# Patient Record
Sex: MALE | Race: WHITE | NOT HISPANIC OR LATINO | Employment: OTHER | URBAN - METROPOLITAN AREA
[De-identification: names, ages, dates, MRNs, and addresses within clinical notes are randomized per-mention and may not be internally consistent; named-entity substitution may affect disease eponyms.]

---

## 2017-04-19 ENCOUNTER — OFFICE VISIT (OUTPATIENT)
Dept: MEDICAL GROUP | Facility: MEDICAL CENTER | Age: 67
End: 2017-04-19
Attending: INTERNAL MEDICINE
Payer: MEDICAID

## 2017-04-19 ENCOUNTER — HOSPITAL ENCOUNTER (OUTPATIENT)
Facility: MEDICAL CENTER | Age: 67
End: 2017-04-19
Attending: INTERNAL MEDICINE
Payer: MEDICAID

## 2017-04-19 VITALS
WEIGHT: 175 LBS | TEMPERATURE: 97.6 F | SYSTOLIC BLOOD PRESSURE: 140 MMHG | HEART RATE: 114 BPM | HEIGHT: 69 IN | OXYGEN SATURATION: 97 % | RESPIRATION RATE: 16 BRPM | BODY MASS INDEX: 25.92 KG/M2 | DIASTOLIC BLOOD PRESSURE: 82 MMHG

## 2017-04-19 DIAGNOSIS — M54.50 CHRONIC BILATERAL LOW BACK PAIN WITHOUT SCIATICA: ICD-10-CM

## 2017-04-19 DIAGNOSIS — G89.29 CHRONIC BILATERAL LOW BACK PAIN WITHOUT SCIATICA: ICD-10-CM

## 2017-04-19 DIAGNOSIS — F41.9 ANXIETY: ICD-10-CM

## 2017-04-19 DIAGNOSIS — Z79.899 CHRONIC PRESCRIPTION BENZODIAZEPINE USE: ICD-10-CM

## 2017-04-19 DIAGNOSIS — F33.1 MODERATE EPISODE OF RECURRENT MAJOR DEPRESSIVE DISORDER (HCC): ICD-10-CM

## 2017-04-19 DIAGNOSIS — J30.2 SEASONAL ALLERGIC RHINITIS, UNSPECIFIED ALLERGIC RHINITIS TRIGGER: ICD-10-CM

## 2017-04-19 DIAGNOSIS — I10 ESSENTIAL HYPERTENSION: ICD-10-CM

## 2017-04-19 DIAGNOSIS — Z13.9 SCREENING: ICD-10-CM

## 2017-04-19 PROCEDURE — 99203 OFFICE O/P NEW LOW 30 MIN: CPT | Performed by: INTERNAL MEDICINE

## 2017-04-19 PROCEDURE — 99204 OFFICE O/P NEW MOD 45 MIN: CPT | Performed by: INTERNAL MEDICINE

## 2017-04-19 PROCEDURE — G0480 DRUG TEST DEF 1-7 CLASSES: HCPCS

## 2017-04-19 PROCEDURE — 80307 DRUG TEST PRSMV CHEM ANLYZR: CPT

## 2017-04-19 RX ORDER — ALPRAZOLAM 2 MG/1
2 TABLET ORAL 3 TIMES DAILY PRN
Qty: 100 TAB | Refills: 0 | Status: SHIPPED | OUTPATIENT
Start: 2017-04-19 | End: 2017-05-25 | Stop reason: SDUPTHER

## 2017-04-19 RX ORDER — TRAMADOL HYDROCHLORIDE 50 MG/1
50 TABLET ORAL EVERY 4 HOURS PRN
COMMUNITY
End: 2017-04-19

## 2017-04-19 RX ORDER — DIPHENHYDRAMINE HCL 25 MG
25 CAPSULE ORAL
COMMUNITY
Start: 2017-04-19

## 2017-04-19 RX ORDER — AMLODIPINE BESYLATE AND BENAZEPRIL HYDROCHLORIDE 5; 20 MG/1; MG/1
1 CAPSULE ORAL DAILY
Qty: 30 CAP | Refills: 6 | Status: SHIPPED | OUTPATIENT
Start: 2017-04-19

## 2017-04-19 RX ORDER — ESCITALOPRAM OXALATE 10 MG/1
10 TABLET ORAL DAILY
COMMUNITY
End: 2017-04-19

## 2017-04-19 RX ORDER — MELOXICAM 15 MG/1
15 TABLET ORAL DAILY
COMMUNITY
End: 2017-04-19 | Stop reason: SDUPTHER

## 2017-04-19 RX ORDER — AMLODIPINE BESYLATE AND BENAZEPRIL HYDROCHLORIDE 5; 20 MG/1; MG/1
1 CAPSULE ORAL DAILY
COMMUNITY
End: 2017-04-19 | Stop reason: SDUPTHER

## 2017-04-19 RX ORDER — ESCITALOPRAM OXALATE 20 MG/1
20 TABLET ORAL DAILY
Qty: 30 TAB | Refills: 6 | Status: SHIPPED | OUTPATIENT
Start: 2017-04-19

## 2017-04-19 RX ORDER — MELOXICAM 15 MG/1
15 TABLET ORAL DAILY
Qty: 30 TAB | Refills: 6 | Status: SHIPPED | OUTPATIENT
Start: 2017-04-19 | End: 2017-08-18

## 2017-04-19 ASSESSMENT — PATIENT HEALTH QUESTIONNAIRE - PHQ9: CLINICAL INTERPRETATION OF PHQ2 SCORE: 1

## 2017-04-19 NOTE — PROGRESS NOTES
Gunnar Mcneil is a 66 y.o. male here for anxiety, hypertension, medication refills, establish care    HPI: Patient was previously being seen by Dr. Tucker for primary care, however she recently changed clinics and now no longer accepts his Medicaid so he had to switch providers.    Chronic bilateral low back pain without sciatica  Patient reports a long history of chronic low back pain. In the past, he has been on Vicodin, but was able to wean himself off of it after he felt he was becoming addicted. He now currently takes meloxicam as needed 15 mg and occasionally will take an Aleve in addition to the meloxicam. His previous provider was also giving him tramadol but he did not feel that this was effective and would like to stop taking it. He uses the meloxicam and/or Aleve about 3 times a week. He stays active by walking daily.    Essential hypertension  Patient reports initially being diagnosed with hypertension at age 20. He currently takes combination amlodipine benazepril 5-20 milligrams daily, which he has been on for about 3 years.  He checks his blood pressure when he goes to the drug store and reports that his numbers are usually within normal range, although he cannot recall them specifically. His blood pressure in clinic today was 140/82.     Anxiety  Patient reports a long history of anxiety with panic attacks about once a month. States that these are usually triggered by changes in his life or bad events during the day. Patient has been on alprazolam for 30 years for the treatment of anxiety. He is also on Lexapro for depression, and reports that this has helped his anxiety somewhat. He was previously taking 8 mg of Xanax a day but has been able to reduce that down to 6 mg a day over the past year or so. He has tried to wean off of the medication and has had significant withdrawal symptoms including nausea, diarrhea, malaise, sweating. He is not interested in seeing a therapist for his anxiety. In  addition to medication, he uses painting and walking to help cope with his anxiety.    Moderate episode of recurrent major depressive disorder (CMS-HCC)  Patient reports a history of intermittent bouts of depression. He is currently taking Lexapro 10 mg daily which was started about 8 months ago by his previous primary care doctor. He did report an improvement in his depression after about 5 weeks of being on the medication. However, at this time he still is moderately symptomatic and would like to try an increased dose. He denies suicidal ideation. He is not interested in seeing a therapist at this time.    Seasonal allergies  Patient reports a history of seasonal allergies for which he takes as needed Benadryl. He reports trying other allergy specific medications in the past and nothing has been as effective as Benadryl.      Current medicines (including changes today)  Current Outpatient Prescriptions   Medication Sig Dispense Refill   • meloxicam (MOBIC) 15 MG tablet Take 1 Tab by mouth every day. 30 Tab 6   • amlodipine-benazepril (LOTREL) 5-20 MG per capsule Take 1 Cap by mouth every day. 30 Cap 6   • escitalopram (LEXAPRO) 20 MG tablet Take 1 Tab by mouth every day. 30 Tab 6   • alprazolam (XANAX) 2 MG tablet Take 1 Tab by mouth 3 times a day as needed for Sleep. 100 Tab 0   • diphenhydrAMINE (BENADRYL) 25 MG capsule Take 1 Cap by mouth 1 time daily as needed (allergy symptoms).       No current facility-administered medications for this visit.     He  has a past medical history of Hypertension; Depression; Anxiety; and Low back pain.  He  has no past surgical history.  Social History   Substance Use Topics   • Smoking status: Never Smoker    • Smokeless tobacco: Never Used   • Alcohol Use: No     Social History     Social History Narrative     Family History   Problem Relation Age of Onset   • Cancer Mother 65     breast   • Hypertension Mother    • Cancer Maternal Grandmother      lung     ROS  As above in  "history of present illness  All other systems reviewed and are negative     Objective:     Blood pressure 140/82, pulse 114, temperature 36.4 °C (97.6 °F), resp. rate 16, height 1.753 m (5' 9.02\"), weight 79.379 kg (175 lb), SpO2 97 %. Body mass index is 25.83 kg/(m^2).  Physical Exam:    Constitutional: Alert, no distress, anxious appearing.  Skin: Warm, dry, good turgor, no rashes in visible areas.  Eye: Equal, round and reactive, conjunctiva clear, lids normal.  ENMT: Lips without lesions, fair dentition, oropharynx clear.  Neck: Trachea midline, no masses, no thyromegaly. No cervical or supraclavicular lymphadenopathy.  Respiratory: Unlabored respiratory effort, lungs clear to auscultation, no wheezes, no ronchi.  Cardiovascular: Normal S1, S2, no murmur, no edema.  Abdomen: Soft, non-tender, no masses, no hepatosplenomegaly.  Psych: Alert and oriented x3, normal mood and affect.      Assessment and Plan:   The following treatment plan was discussed    1. Anxiety  Severe, controlled with high-dose daily benzodiazepines which the patient has been on for 30 years. Withdrawal symptoms when attempts to taper in the past. We discussed that by increasing the dose of his Lexapro for his depression, he may also have some benefit in his anxiety. We could consider in the future a slow taper down on the number of Xanax he receives per month. He reports trying to discontinue the medication entirely in the past and being unsuccessful. Controlled substance agreement and urine drug screen ordered today. Not interested in referral to therapy at this time  -Xanax 2 mg 3 times a day #100 to last one month  -Increase Lexapro to 20 mg daily  -Follow up in one month    2. Chronic bilateral low back pain without sciatica  Well controlled with NSAIDs as needed. Patient will continue meloxicam and Aleve as needed    3. Essential hypertension  Stable, well controlled with current medications  -Continue amlodipine-benazepril 5-20 " milligrams daily, refilled today    4. Moderate episode of recurrent major depressive disorder (CMS-HCC)  Stable although patient feels that his symptoms have been moderately controlled and he would like to increase his dose of Lexapro.  -Increase Lexapro to 20 mg daily  -Follow up in one month    5. Seasonal allergic rhinitis, unspecified allergic rhinitis trigger  Stable controlled with Benadryl as needed    6. Chronic prescription benzodiazepine use  See discussion above  - Controlled Substance Treatment Agreement  - PAIN MANAGEMENT PANEL, SCRN W/ RFLX TO QNT; Future    7. Screening  Following labs will be due in August, patient given lab orders today  - CBC WITH DIFFERENTIAL; Future  - COMP METABOLIC PANEL; Future  - HEMOGLOBIN A1C; Future  - LIPID PROFILE; Future  - PROSTATE SPECIFIC AG SCREENING; Future        Followup: Return in about 4 weeks (around 5/17/2017) for depression, anxiety.

## 2017-04-19 NOTE — ASSESSMENT & PLAN NOTE
Patient reports initially being diagnosed with hypertension at age 20. He currently takes combination amlodipine benazepril 5-20 milligrams daily, which he has been on for about 3 years.  He checks his blood pressure when he goes to the drug store and reports that his numbers are usually within normal range, although he cannot recall them specifically. His blood pressure in clinic today was 140/82.

## 2017-04-19 NOTE — ASSESSMENT & PLAN NOTE
Patient reports a long history of chronic low back pain. In the past, he has been on Vicodin, but was able to wean himself off of it after he felt he was becoming addicted. He now currently takes meloxicam as needed 15 mg and occasionally will take an Aleve in addition to the meloxicam. His previous provider was also giving him tramadol but he did not feel that this was effective and would like to stop taking it. He uses the meloxicam and/or Aleve about 3 times a week. He stays active by walking daily.

## 2017-04-19 NOTE — MR AVS SNAPSHOT
"        Gunnar Mcneil   2017 10:50 AM   Office Visit   MRN: [id number]    Department:  [facility]   Dept Phone:  [phone]    Description:  Male : 1950   Provider:  Denia Chatman M.D.           Reason for Visit     Establish Care medications, BP       Allergies as of 2017     No Known Allergies      You were diagnosed with     Anxiety   [272045]       Chronic bilateral low back pain without sciatica   [9957171]       Essential hypertension   [5169627]       Moderate episode of recurrent major depressive disorder (CMS-HCC)   [1176613]       Screening   [131550]       Chronic prescription benzodiazepine use   [7189078]         Vital Signs     Blood Pressure Pulse Temperature Respirations Height Weight    140/82 mmHg 114 36.4 °C (97.6 °F) 16 1.753 m (5' 9.02\") 79.379 kg (175 lb)    Body Mass Index Oxygen Saturation Smoking Status             25.83 kg/m2 97% Never Smoker          Basic Information     Date Of Birth Sex Race Ethnicity Preferred Language    1950 Male White Non- English      Your appointments     May 25, 2017  8:30 AM   Established Patient with Denia Chatman M.D.   [facility]    [address] [place] [id number]   [phone]           You will be receiving a confirmation call a few days before your appointment from our automated call confirmation system.              Problem List              ICD-10-CM Priority Class Noted - Resolved    Anxiety F41.9   2017 - Present    Chronic bilateral low back pain without sciatica M54.5, G89.29   2017 - Present    Essential hypertension I10   2017 - Present    Moderate episode of recurrent major depressive disorder (CMS-HCC) F33.1   2017 - Present    Chronic prescription benzodiazepine use Z79.899   2017 - Present      Health Maintenance     Patient has no pending health maintenance at this time      Current Immunizations     Tdap Vaccine 2014 11:18 PM      "   Below and/or attached are the medications your provider expects you to take. Review all of your home medications and newly ordered medications with your provider and/or pharmacist. Follow medication instructions as directed by your provider and/or pharmacist. Please keep your medication list with you and share with your provider. Update the information when medications are discontinued, doses are changed, or new medications (including over-the-counter products) are added; and carry medication information at all times in the event of emergency situations     Allergies:  No Known Allergies          Medications  Valid as of: April 19, 2017 - 11:20 AM    Generic Name Brand Name Tablet Size Instructions for use    ALPRAZolam (Tab) XANAX 2 MG Take 1 Tab by mouth 3 times a day as needed for Sleep.        Amlodipine Besy-Benazepril HCl (Cap) LOTREL 5-20 MG Take 1 Cap by mouth every day.        Escitalopram Oxalate (Tab) LEXAPRO 20 MG Take 1 Tab by mouth every day.        Meloxicam (Tab) MOBIC 15 MG Take 1 Tab by mouth every day.        .                 Medicines prescribed today were sent to:     [facility] [address], [place]  [facility] [address] [address]    Phone: [phone] Fax: [phone]    Open 24 Hours?: No      Medication refill instructions:       If your prescription bottle indicates you have medication refills left, it is not necessary to call your provider’s office. Please contact your pharmacy and they will refill your medication.    If your prescription bottle indicates you do not have any refills left, you may request refills at any time through one of the following ways: The online [employer] system (except Urgent Care), by calling your provider’s office, or by asking your pharmacy to contact your provider’s office with a refill request. Medication refills are processed only during regular business hours and may not be available until the next business day. Your  provider may request additional information or to have a follow-up visit with you prior to refilling your medication.   *Please Note: Medication refills are assigned a new Rx number when refilled electronically. Your pharmacy may indicate that no refills were authorized even though a new prescription for the same medication is available at the pharmacy. Please request the medicine by name with the pharmacy before contacting your provider for a refill.        Your To Do List     Future Labs/Procedures Complete By Expires    CBC WITH DIFFERENTIAL  As directed 4/20/2018    COMP METABOLIC PANEL  As directed 4/20/2018    HEMOGLOBIN A1C  As directed 4/20/2018    LIPID PROFILE  As directed 4/20/2018    PROSTATE SPECIFIC AG SCREENING  As directed 4/20/2018         MyChart Access Code: Activation code not generated  Current lark Status: Active

## 2017-04-19 NOTE — ASSESSMENT & PLAN NOTE
Patient reports a history of seasonal allergies for which he takes as needed Benadryl. He reports trying other allergy specific medications in the past and nothing has been as effective as Benadryl.

## 2017-04-19 NOTE — ASSESSMENT & PLAN NOTE
Patient reports a long history of anxiety with panic attacks about once a month. States that these are usually triggered by changes in his life or bad events during the day. Patient has been on alprazolam for 30 years for the treatment of anxiety. He is also on Lexapro for depression, and reports that this has helped his anxiety somewhat. He was previously taking 8 mg of Xanax a day but has been able to reduce that down to 6 mg a day over the past year or so. He has tried to wean off of the medication and has had significant withdrawal symptoms including nausea, diarrhea, malaise, sweating. He is not interested in seeing a therapist for his anxiety. In addition to medication, he uses painting and walking to help cope with his anxiety.

## 2017-04-21 LAB
AMPHET CTO UR CFM-MCNC: NEGATIVE NG/ML
BARBITURATES CTO UR CFM-MCNC: NEGATIVE NG/ML
BENZODIAZ CTO UR CFM-MCNC: POSITIVE NG/ML
BUPRENORPHINE UR-MCNC: NEGATIVE NG/ML
CANNABINOIDS CTO UR CFM-MCNC: NEGATIVE NG/ML
CARISOPRODOL UR-MCNC: NEGATIVE NG/ML
COCAINE CTO UR CFM-MCNC: NEGATIVE NG/ML
DRUG SCREEN COMMENT UR-IMP: NORMAL
ETHYL GLUCURONIDE UR QL SCN: NEGATIVE NG/ML
FENTANYL UR-MCNC: NEGATIVE NG/ML
MEPERIDINE CTO UR SCN-MCNC: NEGATIVE NG/ML
METHADONE CTO UR CFM-MCNC: NEGATIVE NG/ML
OPIATES UR QL SCN: NEGATIVE NG/ML
OXYCDOXYM URSCRN 2005102: NEGATIVE NG/ML
PCP CTO UR CFM-MCNC: NEGATIVE NG/ML
PROPOXYPH CTO UR CFM-MCNC: NEGATIVE NG/ML
TAPENTADOL UR-MCNC: NEGATIVE NG/ML
TRAMADOL CTO UR SCN-MCNC: NEGATIVE NG/ML
ZOLPIDEM UR-MCNC: NEGATIVE NG/ML

## 2017-04-23 LAB
1OH-MIDAZOLAM UR CFM-MCNC: <20 NG/ML
7AMINOCLONAZEPAM UR CFM-MCNC: <5 NG/ML
A-OH ALPRAZ UR CFM-MCNC: >1000 NG/ML
ALPRAZ UR CFM-MCNC: 299 NG/ML
CHLORDIAZEP UR CFM-MCNC: <20 NG/ML
CLONAZEPAM UR CFM-MCNC: <5 NG/ML
DIAZEPAM UR CFM-MCNC: <20 NG/ML
LORAZEPAM UR CFM-MCNC: <20 NG/ML
MIDAZOLAM UR CFM-MCNC: <20 NG/ML
NORDIAZEPAM UR CFM-MCNC: <20 NG/ML
OXAZEPAM UR CFM-MCNC: <20 NG/ML
TEMAZEPAM UR CFM-MCNC: <20 NG/ML

## 2017-05-25 ENCOUNTER — OFFICE VISIT (OUTPATIENT)
Dept: MEDICAL GROUP | Facility: MEDICAL CENTER | Age: 67
End: 2017-05-25
Attending: INTERNAL MEDICINE
Payer: MEDICAID

## 2017-05-25 VITALS
WEIGHT: 168 LBS | DIASTOLIC BLOOD PRESSURE: 72 MMHG | RESPIRATION RATE: 16 BRPM | OXYGEN SATURATION: 97 % | BODY MASS INDEX: 24.88 KG/M2 | SYSTOLIC BLOOD PRESSURE: 118 MMHG | HEART RATE: 80 BPM | HEIGHT: 69 IN | TEMPERATURE: 97.9 F

## 2017-05-25 DIAGNOSIS — F41.9 ANXIETY: ICD-10-CM

## 2017-05-25 DIAGNOSIS — I10 ESSENTIAL HYPERTENSION: ICD-10-CM

## 2017-05-25 DIAGNOSIS — G89.29 CHRONIC BILATERAL LOW BACK PAIN WITHOUT SCIATICA: ICD-10-CM

## 2017-05-25 DIAGNOSIS — L82.1 SEBORRHEIC KERATOSES: ICD-10-CM

## 2017-05-25 DIAGNOSIS — M54.50 CHRONIC BILATERAL LOW BACK PAIN WITHOUT SCIATICA: ICD-10-CM

## 2017-05-25 DIAGNOSIS — F33.42 RECURRENT MAJOR DEPRESSIVE DISORDER, IN FULL REMISSION (HCC): ICD-10-CM

## 2017-05-25 PROCEDURE — 99214 OFFICE O/P EST MOD 30 MIN: CPT | Performed by: INTERNAL MEDICINE

## 2017-05-25 PROCEDURE — 99212 OFFICE O/P EST SF 10 MIN: CPT | Performed by: INTERNAL MEDICINE

## 2017-05-25 RX ORDER — ALPRAZOLAM 2 MG/1
2 TABLET ORAL 4 TIMES DAILY PRN
Qty: 100 TAB | Refills: 0 | Status: SHIPPED | OUTPATIENT
Start: 2017-05-25 | End: 2017-05-25 | Stop reason: SDUPTHER

## 2017-05-25 RX ORDER — ALPRAZOLAM 2 MG/1
2 TABLET ORAL 4 TIMES DAILY PRN
Qty: 100 TAB | Refills: 0 | Status: SHIPPED | OUTPATIENT
Start: 2017-05-25 | End: 2017-08-18

## 2017-05-25 ASSESSMENT — PAIN SCALES - GENERAL: PAINLEVEL: NO PAIN

## 2017-05-25 NOTE — ASSESSMENT & PLAN NOTE
Patient reports a lot of sun exposure in his youth. He comes in today with a seborrheic keratosis on his right temple that he is noticed has grown a little bit in size and he was worried about this.

## 2017-05-25 NOTE — ASSESSMENT & PLAN NOTE
Patient reports dramatic improvement in his depression after we increased his dose of Lexapro from 10 mg daily to 20 mg daily. He has felt less anxious on this medication as well. He is not sure if he has had to rely less on his Xanax, although last month he got a prescription for 90 tablets when he was generally getting prescriptions for 100 tablets in the past, and he was able to make it up until this point.

## 2017-05-25 NOTE — MR AVS SNAPSHOT
"        Gunnar Mcneil   2017 8:30 AM   Office Visit   MRN: [id number]    [facility]   Dept Phone:  [phone]    Description:  Male : 1950   Provider:  Denia Chatman M.D.           Reason for Visit     Follow-Up med refill      Allergies as of 2017     No Known Allergies      Vital Signs     Blood Pressure Pulse Temperature Respirations Height Weight    118/72 mmHg 80 36.6 °C (97.9 °F) 16 1.753 m (5' 9.02\") 76.204 kg (168 lb)    Body Mass Index Oxygen Saturation Smoking Status             24.80 kg/m2 97% Never Smoker          Basic Information     Date Of Birth Sex Race Ethnicity Preferred Language    1950 Male White Non- English      Your appointments     Aug 01, 2017  7:15 AM   Adult Draw/Collection with [facility]   [facility] (--)    [address] [place] [address]   [phone]              Problem List              ICD-10-CM Priority Class Noted - Resolved    Anxiety F41.9   2017 - Present    Chronic bilateral low back pain without sciatica M54.5, G89.29   2017 - Present    Essential hypertension I10   2017 - Present    Moderate episode of recurrent major depressive disorder (CMS-HCC) F33.1   2017 - Present    Chronic prescription benzodiazepine use Z79.899   2017 - Present    Seasonal allergies J30.2   2017 - Present      Health Maintenance        Date Due Completion Dates    COLONOSCOPY 2002 ---    IMM ZOSTER VACCINE 2012 ---    IMM DTaP/Tdap/Td Vaccine (2 - Td) 2024            Current Immunizations     Tdap Vaccine 2014 11:18 PM      Below and/or attached are the medications your provider expects you to take. Review all of your home medications and newly ordered medications with your provider and/or pharmacist. Follow medication instructions as directed by your provider and/or pharmacist. Please keep your medication list with you and share with your provider. Update the " information when medications are discontinued, doses are changed, or new medications (including over-the-counter products) are added; and carry medication information at all times in the event of emergency situations     Allergies:  No Known Allergies          Medications  Valid as of: May 25, 2017 -  8:45 AM    Generic Name Brand Name Tablet Size Instructions for use    ALPRAZolam (Tab) XANAX 2 MG Take 1 Tab by mouth 4 times a day as needed.        Amlodipine Besy-Benazepril HCl (Cap) LOTREL 5-20 MG Take 1 Cap by mouth every day.        DiphenhydrAMINE HCl (Cap) BENADRYL 25 MG Take 1 Cap by mouth 1 time daily as needed (allergy symptoms).        Escitalopram Oxalate (Tab) LEXAPRO 20 MG Take 1 Tab by mouth every day.        Meloxicam (Tab) MOBIC 15 MG Take 1 Tab by mouth every day.        .                 Medicines prescribed today were sent to:     [id number] - [place], [place] - [address] [address] [facility] [place] [address]    Phone: [phone] Fax: [phone]    Open 24 Hours?: No      Medication refill instructions:       If your prescription bottle indicates you have medication refills left, it is not necessary to call your provider’s office. Please contact your pharmacy and they will refill your medication.    If your prescription bottle indicates you do not have any refills left, you may request refills at any time through one of the following ways: The online [employer] system (except Urgent Care), by calling your provider’s office, or by asking your pharmacy to contact your provider’s office with a refill request. Medication refills are processed only during regular business hours and may not be available until the next business day. Your provider may request additional information or to have a follow-up visit with you prior to refilling your medication.   *Please Note: Medication refills are assigned a new Rx number when refilled electronically. Your pharmacy may indicate that no  refills were authorized even though a new prescription for the same medication is available at the pharmacy. Please request the medicine by name with the pharmacy before contacting your provider for a refill.           [employer] Access Code: Activation code not generated  Current M[employer] Status: Active

## 2017-05-25 NOTE — ASSESSMENT & PLAN NOTE
Blood pressure well controlled on amlodipine benazepril combination pill. Patient reports good compliance.

## 2017-05-25 NOTE — ASSESSMENT & PLAN NOTE
Reports a lot of improvement in his low back pain now that he has been able to be more physically active with the better weather. He is currently walking 3 miles a day. He has lost 7 pounds in the past month.  He takes the meloxicam as needed.

## 2017-05-25 NOTE — PROGRESS NOTES
Subjective:   Gunnar Mcneil is a 66 y.o. male here today for medication refill, follow-up depression and chronic medical problems below, skin lesion    Recurrent major depressive disorder, in full remission (CMS-[facility])  Patient reports dramatic improvement in his depression after we increased his dose of Lexapro from 10 mg daily to 20 mg daily. He has felt less anxious on this medication as well. He is not sure if he has had to rely less on his Xanax, although last month he got a prescription for 90 tablets when he was generally getting prescriptions for 100 tablets in the past, and he was able to make it up until this point.    Essential hypertension  Blood pressure well controlled on amlodipine benazepril combination pill. Patient reports good compliance.    Chronic bilateral low back pain without sciatica  Reports a lot of improvement in his low back pain now that he has been able to be more physically active with the better weather. He is currently walking 3 miles a day. He has lost 7 pounds in the past month.  He takes the meloxicam as needed.    Anxiety  Controlled with current therapy of Xanax 2 mg 3 times daily. He will occasionally take an extra tablet on days when he has a lot of stress. He reports that little things bother him a lot and he has frequent panic attacks.    Seborrheic keratoses  Patient reports a lot of sun exposure in his youth. He comes in today with a seborrheic keratosis on his right temple that he is noticed has grown a little bit in size and he was worried about this.         Current medicines (including changes today)  Current Outpatient Prescriptions   Medication Sig Dispense Refill   • alprazolam (XANAX) 2 MG tablet Take 1 Tab by mouth 4 times a day as needed. 100 Tab 0   • meloxicam (MOBIC) 15 MG tablet Take 1 Tab by mouth every day. 30 Tab 6   • amlodipine-benazepril (LOTREL) 5-20 MG per capsule Take 1 Cap by mouth every day. 30 Cap 6   • escitalopram (LEXAPRO) 20 MG tablet Take 1  "Tab by mouth every day. 30 Tab 6   • diphenhydrAMINE (BENADRYL) 25 MG capsule Take 1 Cap by mouth 1 time daily as needed (allergy symptoms).       No current facility-administered medications for this visit.     He  has a past medical history of Hypertension; Depression; Anxiety; and Low back pain.    ROS   As above in HPI     Objective:     Blood pressure 118/72, pulse 80, temperature 36.6 °C (97.9 °F), resp. rate 16, height 1.753 m (5' 9.02\"), weight 76.204 kg (168 lb), SpO2 97 %. Body mass index is 24.8 kg/(m^2).   Physical Exam:  Constitutional: Alert, no distress.  Skin: Warm, dry, good turgor, approximately 8 mm x 4 mm seborrheic keratosis with some pigmentation over right temple.  Eye: Equal, round and reactive, conjunctiva clear, lids normal.  Psych: Alert and oriented x3, normal affect and mood.      Assessment and Plan:   The following treatment plan was discussed    1. Recurrent major depressive disorder, in full remission (CMS-HCC)  Dramatic improvement in depressive symptoms after increasing Lexapro to 20 mg daily. Patient feels that this dose is adequate. We will continue  -Lexapro 20 mg daily    2. Essential hypertension  Stable, well controlled. Continue current medications.  -Amlodipine-benazepril, 5-20 milligrams one tablet daily    3. Chronic bilateral low back pain without sciatica  Improved with weight loss and exercise. Patient will continue to take as needed meloxicam.  -Continue daily exercise, meloxicam when necessary    4. Anxiety  Stable, controlled on current regimen of Xanax 2 mg 3 times a day. Patient does occasionally need a fourth dose so that the 100 tablets per month. He had a consistent urine drug screen on 4/19/17. He signed a controlled substance agreement on 4/19/17 as well. He does not use alcohol or other drugs. He has not requested early refills. Three-month supply given today.  -Xanax 2 mg 3 times daily with additional tablet as needed, #100 given for one month, 3 month " supply given today  -Follow up 3 months    5. Seborrheic keratoses  Discussed with patient that this is not cancerous. He would prefer to just leave it alone for now. We will continue to monitor.      Followup: Return in about 3 months (around 8/25/2017) for anxiety, medication refill.

## 2017-05-25 NOTE — ASSESSMENT & PLAN NOTE
Controlled with current therapy of Xanax 2 mg 3 times daily. He will occasionally take an extra tablet on days when he has a lot of stress. He reports that little things bother him a lot and he has frequent panic attacks.

## 2017-07-19 ENCOUNTER — TELEPHONE (OUTPATIENT)
Dept: MEDICAL GROUP | Facility: MEDICAL CENTER | Age: 67
End: 2017-07-19

## 2017-07-19 NOTE — TELEPHONE ENCOUNTER
His last fill was on 6/24/17 according to the , so he is due for a refill on 7/23.  We can authorize a fill on 7/22 but not earlier.  Denia Chatman M.D.

## 2017-07-19 NOTE — TELEPHONE ENCOUNTER
Phone Number Called: [phone] (home)     Message: Received message from pharmacy asking if they could refill johns xanax order. She said that it shows that it is to be filled on 7-25-17.     Left Message for patient to call back: yes

## 2017-07-19 NOTE — TELEPHONE ENCOUNTER
Phone Number Called: [phone] (home)     Message: Pt's pharmacy was notified of the early fill date.     Left Message for patient to call back: yes

## 2017-08-01 ENCOUNTER — HOSPITAL ENCOUNTER (OUTPATIENT)
Dept: LAB | Facility: MEDICAL CENTER | Age: 67
End: 2017-08-01
Attending: INTERNAL MEDICINE
Payer: MEDICAID

## 2017-08-01 DIAGNOSIS — Z13.9 SCREENING: ICD-10-CM

## 2017-08-01 LAB
ALBUMIN SERPL BCP-MCNC: 4.3 G/DL (ref 3.2–4.9)
ALBUMIN/GLOB SERPL: 1.8 G/DL
ALP SERPL-CCNC: 59 U/L (ref 30–99)
ALT SERPL-CCNC: 18 U/L (ref 2–50)
ANION GAP SERPL CALC-SCNC: 8 MMOL/L (ref 0–11.9)
AST SERPL-CCNC: 17 U/L (ref 12–45)
BASOPHILS # BLD AUTO: 1.6 % (ref 0–1.8)
BASOPHILS # BLD: 0.12 K/UL (ref 0–0.12)
BILIRUB SERPL-MCNC: 0.6 MG/DL (ref 0.1–1.5)
BUN SERPL-MCNC: 14 MG/DL (ref 8–22)
CALCIUM SERPL-MCNC: 9.4 MG/DL (ref 8.5–10.5)
CHLORIDE SERPL-SCNC: 104 MMOL/L (ref 96–112)
CHOLEST SERPL-MCNC: 152 MG/DL (ref 100–199)
CO2 SERPL-SCNC: 25 MMOL/L (ref 20–33)
CREAT SERPL-MCNC: 0.84 MG/DL (ref 0.5–1.4)
EOSINOPHIL # BLD AUTO: 0.12 K/UL (ref 0–0.51)
EOSINOPHIL NFR BLD: 1.6 % (ref 0–6.9)
ERYTHROCYTE [DISTWIDTH] IN BLOOD BY AUTOMATED COUNT: 42 FL (ref 35.9–50)
EST. AVERAGE GLUCOSE BLD GHB EST-MCNC: 108 MG/DL
GFR SERPL CREATININE-BSD FRML MDRD: >60 ML/MIN/1.73 M 2
GLOBULIN SER CALC-MCNC: 2.4 G/DL (ref 1.9–3.5)
GLUCOSE SERPL-MCNC: 91 MG/DL (ref 65–99)
HBA1C MFR BLD: 5.4 % (ref 0–5.6)
HCT VFR BLD AUTO: 47 % (ref 42–52)
HDLC SERPL-MCNC: 52 MG/DL
HGB BLD-MCNC: 15.9 G/DL (ref 14–18)
IMM GRANULOCYTES # BLD AUTO: 0.03 K/UL (ref 0–0.11)
IMM GRANULOCYTES NFR BLD AUTO: 0.4 % (ref 0–0.9)
LDLC SERPL CALC-MCNC: 85 MG/DL
LYMPHOCYTES # BLD AUTO: 2.72 K/UL (ref 1–4.8)
LYMPHOCYTES NFR BLD: 35.6 % (ref 22–41)
MCH RBC QN AUTO: 29.5 PG (ref 27–33)
MCHC RBC AUTO-ENTMCNC: 33.8 G/DL (ref 33.7–35.3)
MCV RBC AUTO: 87.2 FL (ref 81.4–97.8)
MONOCYTES # BLD AUTO: 0.62 K/UL (ref 0–0.85)
MONOCYTES NFR BLD AUTO: 8.1 % (ref 0–13.4)
NEUTROPHILS # BLD AUTO: 4.03 K/UL (ref 1.82–7.42)
NEUTROPHILS NFR BLD: 52.7 % (ref 44–72)
NRBC # BLD AUTO: 0 K/UL
NRBC BLD AUTO-RTO: 0 /100 WBC
PLATELET # BLD AUTO: 334 K/UL (ref 164–446)
PMV BLD AUTO: 9.5 FL (ref 9–12.9)
POTASSIUM SERPL-SCNC: 4.3 MMOL/L (ref 3.6–5.5)
PROT SERPL-MCNC: 6.7 G/DL (ref 6–8.2)
PSA SERPL-MCNC: 1.91 NG/ML (ref 0–4)
RBC # BLD AUTO: 5.39 M/UL (ref 4.7–6.1)
SODIUM SERPL-SCNC: 137 MMOL/L (ref 135–145)
TRIGL SERPL-MCNC: 76 MG/DL (ref 0–149)
WBC # BLD AUTO: 7.6 K/UL (ref 4.8–10.8)

## 2017-08-01 PROCEDURE — 85025 COMPLETE CBC W/AUTO DIFF WBC: CPT

## 2017-08-01 PROCEDURE — 36415 COLL VENOUS BLD VENIPUNCTURE: CPT

## 2017-08-01 PROCEDURE — 80053 COMPREHEN METABOLIC PANEL: CPT

## 2017-08-01 PROCEDURE — 84153 ASSAY OF PSA TOTAL: CPT

## 2017-08-01 PROCEDURE — 83036 HEMOGLOBIN GLYCOSYLATED A1C: CPT

## 2017-08-01 PROCEDURE — 80061 LIPID PANEL: CPT

## 2017-08-18 ENCOUNTER — OFFICE VISIT (OUTPATIENT)
Dept: MEDICAL GROUP | Facility: MEDICAL CENTER | Age: 67
End: 2017-08-18
Attending: INTERNAL MEDICINE
Payer: MEDICAID

## 2017-08-18 VITALS
OXYGEN SATURATION: 97 % | BODY MASS INDEX: 23.85 KG/M2 | HEART RATE: 90 BPM | SYSTOLIC BLOOD PRESSURE: 128 MMHG | WEIGHT: 161 LBS | HEIGHT: 69 IN | RESPIRATION RATE: 16 BRPM | TEMPERATURE: 98.1 F | DIASTOLIC BLOOD PRESSURE: 80 MMHG

## 2017-08-18 DIAGNOSIS — M62.838 MUSCLE SPASM: ICD-10-CM

## 2017-08-18 DIAGNOSIS — F41.9 ANXIETY: ICD-10-CM

## 2017-08-18 DIAGNOSIS — I10 ESSENTIAL HYPERTENSION: ICD-10-CM

## 2017-08-18 PROCEDURE — 99214 OFFICE O/P EST MOD 30 MIN: CPT | Performed by: INTERNAL MEDICINE

## 2017-08-18 PROCEDURE — 99213 OFFICE O/P EST LOW 20 MIN: CPT | Performed by: INTERNAL MEDICINE

## 2017-08-18 RX ORDER — DIAZEPAM 10 MG/1
10 TABLET ORAL EVERY 6 HOURS PRN
Qty: 120 TAB | Refills: 0 | Status: SHIPPED | OUTPATIENT
Start: 2017-08-18 | End: 2018-08-19

## 2017-08-18 RX ORDER — DIAZEPAM 10 MG/1
10 TABLET ORAL EVERY 6 HOURS PRN
Qty: 120 TAB | Refills: 0 | Status: SHIPPED | OUTPATIENT
Start: 2017-08-18 | End: 2017-08-18

## 2017-08-18 RX ORDER — METHOCARBAMOL 750 MG/1
750 TABLET, FILM COATED ORAL 4 TIMES DAILY PRN
Qty: 120 TAB | Refills: 3 | Status: SHIPPED | OUTPATIENT
Start: 2017-08-18 | End: 2018-08-19

## 2017-08-18 RX ORDER — DIAZEPAM 10 MG/1
10 TABLET ORAL EVERY 6 HOURS PRN
Qty: 120 TAB | Refills: 0 | Status: SHIPPED | OUTPATIENT
Start: 2017-08-18 | End: 2017-08-18 | Stop reason: SDUPTHER

## 2017-08-18 ASSESSMENT — PAIN SCALES - GENERAL: PAINLEVEL: NO PAIN

## 2017-08-18 NOTE — ASSESSMENT & PLAN NOTE
Patient reports blood pressures have been well controlled at home in the 120s over 80s. He has been compliant with his amlodipine-benazepril. Blood pressure in clinic today is 128/80.

## 2017-08-18 NOTE — PROGRESS NOTES
Subjective:   Gunnar Mcneil is a 66 y.o. male here today for follow-up lab work, anxiety, hypertension, muscle spasms    Anxiety  Patient reports that over the past several months, he feels his Xanax has not been as effective as was previously. He states he has been on this medication for 30 years and feels that his body has become adjusted to it. He found an old prescription for Valium that he used to take. He feels that he would like to try this can and he talked to his pharmacist, who told him this would probably work better. He continues to take the Lexapro and finds this also to be very helpful for his anxiety.    Essential hypertension  Patient reports blood pressures have been well controlled at home in the 120s over 80s. He has been compliant with his amlodipine-benazepril. Blood pressure in clinic today is 128/80.    Muscle spasm  Patient reports that he has recently become a lot more active than he previously was. He is working out 3-4 times a week and doing william. He is also doing some weightlifting. He reports he is having a lot of muscle spasms in his neck and back as well as his shoulders as a result. He had an old prescription for methocarbamol which he took and found it helpful. He is requesting to be restarted on this medication today. He denies any drowsiness associated with the medication.         Current medicines (including changes today)  Current Outpatient Prescriptions   Medication Sig Dispense Refill   • methocarbamol (ROBAXIN) 750 MG Tab Take 1 Tab by mouth 4 times a day as needed. 120 Tab 3   • diazepam (VALIUM) 10 MG tablet Take 1 Tab by mouth every 6 hours as needed for Anxiety. 120 Tab 0   • amlodipine-benazepril (LOTREL) 5-20 MG per capsule Take 1 Cap by mouth every day. 30 Cap 6   • escitalopram (LEXAPRO) 20 MG tablet Take 1 Tab by mouth every day. 30 Tab 6   • diphenhydrAMINE (BENADRYL) 25 MG capsule Take 1 Cap by mouth 1 time daily as needed (allergy symptoms).       No current  "facility-administered medications for this visit.     He  has a past medical history of Hypertension; Depression; Anxiety; and Low back pain.    ROS   As above in HPI     Objective:     Blood pressure 128/80, pulse 90, temperature 36.7 °C (98.1 °F), resp. rate 16, height 1.753 m (5' 9.02\"), weight 73.029 kg (161 lb), SpO2 97 %. Body mass index is 23.76 kg/(m^2).   Physical Exam:  Constitutional: Alert, no distress.  Skin: Warm, dry, good turgor, no rashes in visible areas.  Eye: Equal, round and reactive, conjunctiva clear, lids normal.  MSK: No evidence of muscle spasms currently in neck, upper back, legs. Nontender to palpation over trapezius and cervical paraspinal muscles  Psych: Alert and oriented x3, normal affect and mood.    Results and Imaging:   Lab Results   Component Value Date/Time    CHOLESTEROL, 08/01/2017 07:09 AM    LDL 85 08/01/2017 07:09 AM    HDL 52 08/01/2017 07:09 AM    TRIGLYCERIDES 76 08/01/2017 07:09 AM       Lab Results   Component Value Date/Time    SODIUM 137 08/01/2017 07:09 AM    POTASSIUM 4.3 08/01/2017 07:09 AM    CHLORIDE 104 08/01/2017 07:09 AM    CO2 25 08/01/2017 07:09 AM    GLUCOSE 91 08/01/2017 07:09 AM    BUN 14 08/01/2017 07:09 AM    CREATININE 0.84 08/01/2017 07:09 AM     Lab Results   Component Value Date/Time    ALKALINE PHOSPHATASE 59 08/01/2017 07:09 AM    AST(SGOT) 17 08/01/2017 07:09 AM    ALT(SGPT) 18 08/01/2017 07:09 AM    TOTAL BILIRUBIN 0.6 08/01/2017 07:09 AM      Lab Results   Component Value Date/Time    WBC 7.6 08/01/2017 07:09 AM    RBC 5.39 08/01/2017 07:09 AM    HEMOGLOBIN 15.9 08/01/2017 07:09 AM    HEMATOCRIT 47.0 08/01/2017 07:09 AM    MCV 87.2 08/01/2017 07:09 AM    MCH 29.5 08/01/2017 07:09 AM    MCHC 33.8 08/01/2017 07:09 AM    MPV 9.5 08/01/2017 07:09 AM    NEUTROPHILS-POLYS 52.70 08/01/2017 07:09 AM    LYMPHOCYTES 35.60 08/01/2017 07:09 AM    MONOCYTES 8.10 08/01/2017 07:09 AM    EOSINOPHILS 1.60 08/01/2017 07:09 AM    BASOPHILS 1.60 08/01/2017 " 07:09 AM           Ref. Range 8/1/2017 07:09   Glycohemoglobin Latest Ref Range: 0.0-5.6 % 5.4   Estim. Avg Glu Latest Units: mg/dL 108      Ref. Range 8/1/2017 07:09   Prostatic Specific Antigen Tot Latest Ref Range: 0.00-4.00 ng/mL 1.91       Assessment and Plan:   The following treatment plan was discussed    1. Anxiety  Patient feels his anxiety is less well controlled and his Xanax is not working well. He would like to switch to Valium which she has used in the past. We will give this a try for the next 3 months. He will let us know if it is not effective for him. Three-month supply given today.  -Valium 10 mg 4 times a day when necessary #120 given to last 3 months, three-month supply given today    2. Essential hypertension  Stable, well controlled with amlodipine-benazepril 5-20. We will continue this medication.    3. Muscle spasm  Uncontrolled. Patient restarted an old prescription of Robaxin which was helpful. I have given him a supply of this today to take as needed.  -Robaxin 750 mg 4 times a day when necessary muscle spasms      HCM  In regards to his health maintenance, patient states he had a colonoscopy done in [place] about a year and half ago and was told that it was normal. He will call us with the office information so that we can request those records.      Followup: Return in about 3 months (around 11/18/2017) for med refills.

## 2017-08-18 NOTE — MR AVS SNAPSHOT
"        Gunnar Mcneil   2017 7:30 AM   Office Visit   MRN: [id number]    [facility]   Dept Phone:  [phone]    Description:  Male : 1950   Provider:  Denia Chatman M.D.           Reason for Visit     Follow-Up lab results, medication change       Allergies as of 2017     No Known Allergies      You were diagnosed with     Anxiety   [219202]       Essential hypertension   [4773594]       Muscle spasm   [178568]         Vital Signs     Blood Pressure Pulse Temperature Respirations Height Weight    128/80 mmHg 90 36.7 °C (98.1 °F) 16 1.753 m (5' 9.02\") 73.029 kg (161 lb)    Body Mass Index Oxygen Saturation Smoking Status             23.76 kg/m2 97% Never Smoker          Basic Information     Date Of Birth Sex Race Ethnicity Preferred Language    1950 Male White Non- English      Problem List              ICD-10-CM Priority Class Noted - Resolved    Anxiety F41.9   2017 - Present    Chronic bilateral low back pain without sciatica M54.5, G89.29   2017 - Present    Essential hypertension I10   2017 - Present    Recurrent major depressive disorder, in full remission (CMS-[facility]) F33.42   2017 - Present    Chronic prescription benzodiazepine use Z79.899   2017 - Present    Seasonal allergies J30.2   2017 - Present    Seborrheic keratoses L82.1   2017 - Present    Muscle spasm M62.838   2017 - Present      Health Maintenance        Date Due Completion Dates    COLONOSCOPY 2002 ---    IMM ZOSTER VACCINE 2012 ---    IMM INFLUENZA (1) 2017 ---    IMM DTaP/Tdap/Td Vaccine (2 - Td) 2024            Current Immunizations     Tdap Vaccine 2014 11:18 PM      Below and/or attached are the medications your provider expects you to take. Review all of your home medications and newly ordered medications with your provider and/or pharmacist. Follow medication instructions as directed by your provider and/or " pharmacist. Please keep your medication list with you and share with your provider. Update the information when medications are discontinued, doses are changed, or new medications (including over-the-counter products) are added; and carry medication information at all times in the event of emergency situations     Allergies:  No Known Allergies          Medications  Valid as of: August 18, 2017 -  8:19 AM    Generic Name Brand Name Tablet Size Instructions for use    Amlodipine Besy-Benazepril HCl (Cap) LOTREL 5-20 MG Take 1 Cap by mouth every day.        DiazePAM (Tab) VALIUM 10 MG Take 1 Tab by mouth every 6 hours as needed for Anxiety.        DiphenhydrAMINE HCl (Cap) BENADRYL 25 MG Take 1 Cap by mouth 1 time daily as needed (allergy symptoms).        Escitalopram Oxalate (Tab) LEXAPRO 20 MG Take 1 Tab by mouth every day.        Methocarbamol (Tab) ROBAXIN 750 MG Take 1 Tab by mouth 4 times a day as needed.        .                 Medicines prescribed today were sent to:     [facility] - [place], [place] - [address] [place] [address]    Phone: [phone] Fax: [phone]    Open 24 Hours?: No      Medication refill instructions:       If your prescription bottle indicates you have medication refills left, it is not necessary to call your provider’s office. Please contact your pharmacy and they will refill your medication.    If your prescription bottle indicates you do not have any refills left, you may request refills at any time through one of the following ways: The online [employer] system (except Urgent Care), by calling your provider’s office, or by asking your pharmacy to contact your provider’s office with a refill request. Medication refills are processed only during regular business hours and may not be available until the next business day. Your provider may request additional information or to have a follow-up visit with you prior to refilling your medication.      *Please Note: Medication refills are assigned a new Rx number when refilled electronically. Your pharmacy may indicate that no refills were authorized even though a new prescription for the same medication is available at the pharmacy. Please request the medicine by name with the pharmacy before contacting your provider for a refill.           [employer] Access Code: Activation code not generated  Current [employer] Status: Active

## 2017-08-18 NOTE — ASSESSMENT & PLAN NOTE
Patient reports that he has recently become a lot more active than he previously was. He is working out 3-4 times a week and doing william. He is also doing some weightlifting. He reports he is having a lot of muscle spasms in his neck and back as well as his shoulders as a result. He had an old prescription for methocarbamol which he took and found it helpful. He is requesting to be restarted on this medication today. He denies any drowsiness associated with the medication.

## 2017-08-18 NOTE — ASSESSMENT & PLAN NOTE
Patient reports that over the past several months, he feels his Xanax has not been as effective as was previously. He states he has been on this medication for 30 years and feels that his body has become adjusted to it. He found an old prescription for Valium that he used to take. He feels that he would like to try this can and he talked to his pharmacist, who told him this would probably work better. He continues to take the Lexapro and finds this also to be very helpful for his anxiety.

## 2018-02-20 ENCOUNTER — HOSPITAL ENCOUNTER (OUTPATIENT)
Dept: LAB | Facility: MEDICAL CENTER | Age: 68
End: 2018-02-20
Attending: NURSE PRACTITIONER
Payer: MEDICARE

## 2018-02-20 LAB
ALBUMIN SERPL BCP-MCNC: 4.7 G/DL (ref 3.2–4.9)
ALBUMIN/GLOB SERPL: 2.1 G/DL
ALP SERPL-CCNC: 58 U/L (ref 30–99)
ALT SERPL-CCNC: 16 U/L (ref 2–50)
ANION GAP SERPL CALC-SCNC: 4 MMOL/L (ref 0–11.9)
AST SERPL-CCNC: 15 U/L (ref 12–45)
BASOPHILS # BLD AUTO: 1.7 % (ref 0–1.8)
BASOPHILS # BLD: 0.15 K/UL (ref 0–0.12)
BILIRUB SERPL-MCNC: 0.8 MG/DL (ref 0.1–1.5)
BUN SERPL-MCNC: 13 MG/DL (ref 8–22)
CALCIUM SERPL-MCNC: 9.1 MG/DL (ref 8.5–10.5)
CHLORIDE SERPL-SCNC: 102 MMOL/L (ref 96–112)
CHOLEST SERPL-MCNC: 152 MG/DL (ref 100–199)
CO2 SERPL-SCNC: 32 MMOL/L (ref 20–33)
CREAT SERPL-MCNC: 0.83 MG/DL (ref 0.5–1.4)
EOSINOPHIL # BLD AUTO: 0.09 K/UL (ref 0–0.51)
EOSINOPHIL NFR BLD: 1 % (ref 0–6.9)
ERYTHROCYTE [DISTWIDTH] IN BLOOD BY AUTOMATED COUNT: 40.6 FL (ref 35.9–50)
EST. AVERAGE GLUCOSE BLD GHB EST-MCNC: 111 MG/DL
GLOBULIN SER CALC-MCNC: 2.2 G/DL (ref 1.9–3.5)
GLUCOSE SERPL-MCNC: 105 MG/DL (ref 65–99)
HBA1C MFR BLD: 5.5 % (ref 0–5.6)
HCT VFR BLD AUTO: 48.2 % (ref 42–52)
HDLC SERPL-MCNC: 67 MG/DL
HGB BLD-MCNC: 15.9 G/DL (ref 14–18)
IMM GRANULOCYTES # BLD AUTO: 0.03 K/UL (ref 0–0.11)
IMM GRANULOCYTES NFR BLD AUTO: 0.3 % (ref 0–0.9)
LDLC SERPL CALC-MCNC: 75 MG/DL
LYMPHOCYTES # BLD AUTO: 2.39 K/UL (ref 1–4.8)
LYMPHOCYTES NFR BLD: 27.4 % (ref 22–41)
MCH RBC QN AUTO: 29.2 PG (ref 27–33)
MCHC RBC AUTO-ENTMCNC: 33 G/DL (ref 33.7–35.3)
MCV RBC AUTO: 88.4 FL (ref 81.4–97.8)
MONOCYTES # BLD AUTO: 0.76 K/UL (ref 0–0.85)
MONOCYTES NFR BLD AUTO: 8.7 % (ref 0–13.4)
NEUTROPHILS # BLD AUTO: 5.31 K/UL (ref 1.82–7.42)
NEUTROPHILS NFR BLD: 60.9 % (ref 44–72)
NRBC # BLD AUTO: 0 K/UL
NRBC BLD-RTO: 0 /100 WBC
PLATELET # BLD AUTO: 339 K/UL (ref 164–446)
PMV BLD AUTO: 9.6 FL (ref 9–12.9)
POTASSIUM SERPL-SCNC: 4 MMOL/L (ref 3.6–5.5)
PROT SERPL-MCNC: 6.9 G/DL (ref 6–8.2)
RBC # BLD AUTO: 5.45 M/UL (ref 4.7–6.1)
SODIUM SERPL-SCNC: 138 MMOL/L (ref 135–145)
TRIGL SERPL-MCNC: 52 MG/DL (ref 0–149)
WBC # BLD AUTO: 8.7 K/UL (ref 4.8–10.8)

## 2018-02-20 PROCEDURE — 83036 HEMOGLOBIN GLYCOSYLATED A1C: CPT

## 2018-02-20 PROCEDURE — 80061 LIPID PANEL: CPT

## 2018-02-20 PROCEDURE — 80053 COMPREHEN METABOLIC PANEL: CPT

## 2018-02-20 PROCEDURE — 36415 COLL VENOUS BLD VENIPUNCTURE: CPT

## 2018-02-20 PROCEDURE — 85025 COMPLETE CBC W/AUTO DIFF WBC: CPT

## 2018-08-15 ENCOUNTER — OFFICE VISIT (OUTPATIENT)
Dept: URGENT CARE | Facility: CLINIC | Age: 68
End: 2018-08-15
Payer: MEDICARE

## 2018-08-15 ENCOUNTER — HOSPITAL ENCOUNTER (OUTPATIENT)
Facility: MEDICAL CENTER | Age: 68
End: 2018-08-15
Attending: NURSE PRACTITIONER
Payer: MEDICARE

## 2018-08-15 VITALS
BODY MASS INDEX: 23.85 KG/M2 | DIASTOLIC BLOOD PRESSURE: 82 MMHG | TEMPERATURE: 98.6 F | HEIGHT: 69 IN | RESPIRATION RATE: 16 BRPM | SYSTOLIC BLOOD PRESSURE: 126 MMHG | WEIGHT: 161 LBS | HEART RATE: 120 BPM | OXYGEN SATURATION: 98 %

## 2018-08-15 DIAGNOSIS — R30.0 DYSURIA: ICD-10-CM

## 2018-08-15 DIAGNOSIS — F41.9 ANXIETY: ICD-10-CM

## 2018-08-15 DIAGNOSIS — R39.11 URINARY HESITANCY: ICD-10-CM

## 2018-08-15 LAB
APPEARANCE UR: CLEAR
BILIRUB UR STRIP-MCNC: NEGATIVE MG/DL
COLOR UR AUTO: YELLOW
GLUCOSE UR STRIP.AUTO-MCNC: NEGATIVE MG/DL
KETONES UR STRIP.AUTO-MCNC: NEGATIVE MG/DL
LEUKOCYTE ESTERASE UR QL STRIP.AUTO: NEGATIVE
NITRITE UR QL STRIP.AUTO: NEGATIVE
PH UR STRIP.AUTO: 7 [PH] (ref 5–8)
PROT UR QL STRIP: NEGATIVE MG/DL
RBC UR QL AUTO: NEGATIVE
SP GR UR STRIP.AUTO: 1.01
UROBILINOGEN UR STRIP-MCNC: 0.2 MG/DL

## 2018-08-15 PROCEDURE — 87491 CHLMYD TRACH DNA AMP PROBE: CPT

## 2018-08-15 PROCEDURE — 81002 URINALYSIS NONAUTO W/O SCOPE: CPT | Performed by: NURSE PRACTITIONER

## 2018-08-15 PROCEDURE — 87086 URINE CULTURE/COLONY COUNT: CPT

## 2018-08-15 PROCEDURE — 99204 OFFICE O/P NEW MOD 45 MIN: CPT | Performed by: NURSE PRACTITIONER

## 2018-08-15 PROCEDURE — 87591 N.GONORRHOEAE DNA AMP PROB: CPT

## 2018-08-15 RX ORDER — BUSPIRONE HYDROCHLORIDE 10 MG/1
10 TABLET ORAL 3 TIMES DAILY
COMMUNITY

## 2018-08-15 RX ORDER — SULFAMETHOXAZOLE AND TRIMETHOPRIM 800; 160 MG/1; MG/1
1 TABLET ORAL EVERY 12 HOURS
Qty: 14 TAB | Refills: 0 | Status: SHIPPED | OUTPATIENT
Start: 2018-08-15 | End: 2018-08-22

## 2018-08-15 RX ORDER — CYCLOBENZAPRINE HCL 5 MG
5-10 TABLET ORAL 3 TIMES DAILY PRN
COMMUNITY
End: 2018-08-19

## 2018-08-15 RX ORDER — BUPROPION HYDROCHLORIDE 150 MG/1
150 TABLET ORAL EVERY MORNING
COMMUNITY

## 2018-08-15 ASSESSMENT — ENCOUNTER SYMPTOMS
SORE THROAT: 0
CHILLS: 0
DIZZINESS: 0
EYE PAIN: 0
MYALGIAS: 0
FEVER: 0
VOMITING: 0
FLANK PAIN: 0
NAUSEA: 0
SHORTNESS OF BREATH: 0

## 2018-08-15 NOTE — PROGRESS NOTES
Subjective:     Gunnar Mcneil is a 67 y.o. male who presents for UTI (x 1 day, burns to pee, have to strain to urinate. )  Patient presents clinic today with complaints of urinary discomfort ×1 day. Patient has burning of the urethra, urinary hesitancy. Patient denies occasions with his prostate. Patient denies any history of UTIs. Patient denies any STD exposure.     Dysuria    This is a new problem. The current episode started yesterday. The problem occurs every urination. The problem has been unchanged. The quality of the pain is described as burning. The pain is at a severity of 2/10. The pain is mild. There has been no fever. There is no history of pyelonephritis. Associated symptoms include hesitancy. Pertinent negatives include no chills, discharge, flank pain, frequency, hematuria, nausea, urgency or vomiting. He has tried nothing for the symptoms. The treatment provided no relief. There is no history of recurrent UTIs, a single kidney or a urological procedure.   Patient verbalizing he is nervous when he comes into doctors offices. Patient's heart rate elevated. Denies any chest pain, shortness of breath, palpitations.  Past Medical History:   Diagnosis Date   • Anxiety    • Depression    • Hypertension    • Low back pain    History reviewed. No pertinent surgical history.  Social History     Social History   • Marital status: Single     Spouse name: N/A   • Number of children: N/A   • Years of education: N/A     Occupational History   • Not on file.     Social History Main Topics   • Smoking status: Never Smoker   • Smokeless tobacco: Never Used   • Alcohol use No   • Drug use: No   • Sexual activity: Not on file     Other Topics Concern   • Not on file     Social History Narrative   • No narrative on file      Family History   Problem Relation Age of Onset   • Cancer Mother 65        breast   • Hypertension Mother    • Cancer Maternal Grandmother         lung    Review of Systems   Constitutional:  "Negative for chills and fever.   HENT: Negative for sore throat.    Eyes: Negative for pain.   Respiratory: Negative for shortness of breath.    Cardiovascular: Negative for chest pain.   Gastrointestinal: Negative for nausea and vomiting.   Genitourinary: Positive for dysuria and hesitancy. Negative for flank pain, frequency, hematuria and urgency.        Urinary hesitancy   Musculoskeletal: Negative for myalgias.   Skin: Negative for rash.   Neurological: Negative for dizziness.   No Known Allergies   Objective:   /82   Pulse (!) 120 Comment: taken manually  Temp 37 °C (98.6 °F)   Resp 16   Ht 1.753 m (5' 9\")   Wt 73 kg (161 lb)   SpO2 98%   BMI 23.78 kg/m²   Physical Exam   Constitutional: He is oriented to person, place, and time. He appears well-developed and well-nourished. No distress.   HENT:   Head: Normocephalic and atraumatic.   Right Ear: Tympanic membrane normal.   Left Ear: Tympanic membrane normal.   Nose: Nose normal. Right sinus exhibits no maxillary sinus tenderness and no frontal sinus tenderness. Left sinus exhibits no maxillary sinus tenderness and no frontal sinus tenderness.   Mouth/Throat: Uvula is midline, oropharynx is clear and moist and mucous membranes are normal. No posterior oropharyngeal edema, posterior oropharyngeal erythema or tonsillar abscesses. No tonsillar exudate.   Eyes: Pupils are equal, round, and reactive to light. Conjunctivae and EOM are normal. Right eye exhibits no discharge. Left eye exhibits no discharge.   Cardiovascular: Regular rhythm, S1 normal, S2 normal and normal heart sounds.  Tachycardia present.  PMI is not displaced.    No murmur heard.  Patient appears anxious   Pulmonary/Chest: Effort normal and breath sounds normal. No respiratory distress.   Abdominal: Soft. Bowel sounds are normal. He exhibits no distension. There is no tenderness. There is no rigidity, no guarding, no CVA tenderness and no tenderness at McBurney's point.   Genitourinary: "   Genitourinary Comments: Patient deferred a prostate exam   Neurological: He is alert and oriented to person, place, and time. He has normal reflexes. No sensory deficit.   Skin: Skin is warm, dry and intact.   Psychiatric: His speech is normal and behavior is normal. Judgment and thought content normal. His mood appears anxious. Cognition and memory are normal.         Assessment/Plan:   Assessment    1. Dysuria  POCT Urinalysis    Urine Culture    sulfamethoxazole-trimethoprim (BACTRIM DS) 800-160 MG tablet    CHLAMYDIA/GC PCR URINE OR SWAB   2. Urinary hesitancy     3. Anxiety       Will treat for suspected urinary infection patient with dysuria, urinary hesitancy. We'll send urine culture and follow up with pending results. Patient does have a scheduled follow-up appointment with PCP September 4. Advised patient if symptoms not improve and worsen to follow up as scheduled.   Patient's heart rate rechecked 90. Patient verbalizing he is nervous when he comes into exam rooms.  Patient given precautionary s/sx that mandate immediate follow up and evaluation in the ED. Advised of risks of not doing so.    DDX, Supportive care, and indications for immediate follow-up discussed with patient.    Instructed to return to clinic or nearest emergency department if we are not available for any change in condition, further concerns, or worsening of symptoms.    The patient demonstrated a good understanding and agreed with the treatment plan.

## 2018-08-16 DIAGNOSIS — R30.0 DYSURIA: ICD-10-CM

## 2018-08-16 LAB
C TRACH DNA SPEC QL NAA+PROBE: NEGATIVE
N GONORRHOEA DNA SPEC QL NAA+PROBE: NEGATIVE
SPECIMEN SOURCE: NORMAL

## 2018-08-18 LAB
BACTERIA UR CULT: NORMAL
SIGNIFICANT IND 70042: NORMAL
SITE SITE: NORMAL
SOURCE SOURCE: NORMAL

## 2018-08-19 ENCOUNTER — OFFICE VISIT (OUTPATIENT)
Dept: URGENT CARE | Facility: CLINIC | Age: 68
End: 2018-08-19
Payer: MEDICARE

## 2018-08-19 VITALS
OXYGEN SATURATION: 93 % | DIASTOLIC BLOOD PRESSURE: 72 MMHG | RESPIRATION RATE: 16 BRPM | HEART RATE: 104 BPM | WEIGHT: 161 LBS | BODY MASS INDEX: 23.85 KG/M2 | SYSTOLIC BLOOD PRESSURE: 116 MMHG | TEMPERATURE: 99.5 F | HEIGHT: 69 IN

## 2018-08-19 DIAGNOSIS — R35.0 URINARY FREQUENCY: ICD-10-CM

## 2018-08-19 DIAGNOSIS — M54.50 ACUTE BILATERAL LOW BACK PAIN WITHOUT SCIATICA: ICD-10-CM

## 2018-08-19 DIAGNOSIS — R81 GLUCOSURIA: ICD-10-CM

## 2018-08-19 LAB — GLUCOSE BLD-MCNC: 121 MG/DL (ref 70–100)

## 2018-08-19 PROCEDURE — 99214 OFFICE O/P EST MOD 30 MIN: CPT | Performed by: PHYSICIAN ASSISTANT

## 2018-08-19 PROCEDURE — 82962 GLUCOSE BLOOD TEST: CPT | Performed by: PHYSICIAN ASSISTANT

## 2018-08-19 RX ORDER — CYCLOBENZAPRINE HCL 5 MG
5 TABLET ORAL 3 TIMES DAILY PRN
Qty: 30 TAB | Refills: 0 | Status: SHIPPED | OUTPATIENT
Start: 2018-08-19 | End: 2021-07-14

## 2018-08-19 RX ORDER — KETOROLAC TROMETHAMINE 30 MG/ML
60 INJECTION, SOLUTION INTRAMUSCULAR; INTRAVENOUS ONCE
Status: COMPLETED | OUTPATIENT
Start: 2018-08-19 | End: 2018-08-19

## 2018-08-19 RX ADMIN — KETOROLAC TROMETHAMINE 60 MG: 30 INJECTION, SOLUTION INTRAMUSCULAR; INTRAVENOUS at 14:23

## 2018-08-21 ENCOUNTER — HOSPITAL ENCOUNTER (OUTPATIENT)
Dept: LAB | Facility: MEDICAL CENTER | Age: 68
End: 2018-08-21
Attending: NURSE PRACTITIONER
Payer: MEDICARE

## 2018-08-21 LAB
ALBUMIN SERPL BCP-MCNC: 3.6 G/DL (ref 3.2–4.9)
ALBUMIN/GLOB SERPL: 1.6 G/DL
ALP SERPL-CCNC: 77 U/L (ref 30–99)
ALT SERPL-CCNC: 23 U/L (ref 2–50)
ANION GAP SERPL CALC-SCNC: 12 MMOL/L (ref 0–11.9)
AST SERPL-CCNC: 23 U/L (ref 12–45)
BASOPHILS # BLD AUTO: 0.9 % (ref 0–1.8)
BASOPHILS # BLD: 0.06 K/UL (ref 0–0.12)
BILIRUB SERPL-MCNC: 0.4 MG/DL (ref 0.1–1.5)
BUN SERPL-MCNC: 45 MG/DL (ref 8–22)
CALCIUM SERPL-MCNC: 8.7 MG/DL (ref 8.5–10.5)
CHLORIDE SERPL-SCNC: 99 MMOL/L (ref 96–112)
CHOLEST SERPL-MCNC: 124 MG/DL (ref 100–199)
CO2 SERPL-SCNC: 23 MMOL/L (ref 20–33)
CREAT SERPL-MCNC: 4.7 MG/DL (ref 0.5–1.4)
EOSINOPHIL # BLD AUTO: 0.33 K/UL (ref 0–0.51)
EOSINOPHIL NFR BLD: 5.2 % (ref 0–6.9)
ERYTHROCYTE [DISTWIDTH] IN BLOOD BY AUTOMATED COUNT: 41 FL (ref 35.9–50)
EST. AVERAGE GLUCOSE BLD GHB EST-MCNC: 111 MG/DL
GLOBULIN SER CALC-MCNC: 2.3 G/DL (ref 1.9–3.5)
GLUCOSE SERPL-MCNC: 92 MG/DL (ref 65–99)
HBA1C MFR BLD: 5.5 % (ref 0–5.6)
HCT VFR BLD AUTO: 40.9 % (ref 42–52)
HCV AB SER QL: NEGATIVE
HDLC SERPL-MCNC: 42 MG/DL
HGB BLD-MCNC: 13.5 G/DL (ref 14–18)
IMM GRANULOCYTES # BLD AUTO: 0.05 K/UL (ref 0–0.11)
IMM GRANULOCYTES NFR BLD AUTO: 0.8 % (ref 0–0.9)
LDLC SERPL CALC-MCNC: 67 MG/DL
LYMPHOCYTES # BLD AUTO: 0.57 K/UL (ref 1–4.8)
LYMPHOCYTES NFR BLD: 8.9 % (ref 22–41)
MCH RBC QN AUTO: 29 PG (ref 27–33)
MCHC RBC AUTO-ENTMCNC: 33 G/DL (ref 33.7–35.3)
MCV RBC AUTO: 88 FL (ref 81.4–97.8)
MONOCYTES # BLD AUTO: 0.83 K/UL (ref 0–0.85)
MONOCYTES NFR BLD AUTO: 13 % (ref 0–13.4)
NEUTROPHILS # BLD AUTO: 4.53 K/UL (ref 1.82–7.42)
NEUTROPHILS NFR BLD: 71.2 % (ref 44–72)
NRBC # BLD AUTO: 0 K/UL
NRBC BLD-RTO: 0 /100 WBC
PLATELET # BLD AUTO: 247 K/UL (ref 164–446)
PMV BLD AUTO: 9.5 FL (ref 9–12.9)
POTASSIUM SERPL-SCNC: 4.3 MMOL/L (ref 3.6–5.5)
PROT SERPL-MCNC: 5.9 G/DL (ref 6–8.2)
PSA SERPL-MCNC: 3.98 NG/ML (ref 0–4)
RBC # BLD AUTO: 4.65 M/UL (ref 4.7–6.1)
SODIUM SERPL-SCNC: 134 MMOL/L (ref 135–145)
T4 FREE SERPL-MCNC: 0.99 NG/DL (ref 0.53–1.43)
TRIGL SERPL-MCNC: 77 MG/DL (ref 0–149)
TSH SERPL DL<=0.005 MIU/L-ACNC: 1.74 UIU/ML (ref 0.38–5.33)
WBC # BLD AUTO: 6.4 K/UL (ref 4.8–10.8)

## 2018-08-21 PROCEDURE — 84443 ASSAY THYROID STIM HORMONE: CPT

## 2018-08-21 PROCEDURE — 86803 HEPATITIS C AB TEST: CPT

## 2018-08-21 PROCEDURE — 84439 ASSAY OF FREE THYROXINE: CPT

## 2018-08-21 PROCEDURE — 36415 COLL VENOUS BLD VENIPUNCTURE: CPT

## 2018-08-21 PROCEDURE — 80053 COMPREHEN METABOLIC PANEL: CPT

## 2018-08-21 PROCEDURE — 80061 LIPID PANEL: CPT

## 2018-08-21 PROCEDURE — 84153 ASSAY OF PSA TOTAL: CPT

## 2018-08-21 PROCEDURE — 83036 HEMOGLOBIN GLYCOSYLATED A1C: CPT

## 2018-08-21 PROCEDURE — 85025 COMPLETE CBC W/AUTO DIFF WBC: CPT

## 2018-08-21 ASSESSMENT — ENCOUNTER SYMPTOMS
VOMITING: 0
DIARRHEA: 0
MUSCULOSKELETAL NEGATIVE: 1
FEVER: 0
ABDOMINAL PAIN: 0
NAUSEA: 0
CHANGE IN BOWEL HABIT: 0
CHILLS: 0
DIZZINESS: 0
SHORTNESS OF BREATH: 0

## 2018-08-21 NOTE — PROGRESS NOTES
"Subjective:      Gunnar Mcneil is a 67 y.o. male who presents with Abdominal Pain (lower both sides pain while urinating, frequency. )            Urinary Frequency   This is a new problem. The current episode started in the past 7 days. The problem occurs constantly. The problem has been unchanged. Pertinent negatives include no abdominal pain, change in bowel habit, chest pain, chills, congestion, fever, nausea or vomiting. Nothing aggravates the symptoms. Treatments tried: antibiotics.     Patient was seen in urgent care 4 days ago for the same problem. He was started on a course of Bactrim, which he has been taking as instructed with mild relief. Urine culture from that visit was negative. He denies fevers, chills, body aches, abdominal pain, nausea or vomiting.     Review of Systems   Constitutional: Negative for chills and fever.   HENT: Negative for congestion.    Respiratory: Negative for shortness of breath.    Cardiovascular: Negative for chest pain.   Gastrointestinal: Negative for abdominal pain, change in bowel habit, diarrhea, nausea and vomiting.   Genitourinary: Negative.    Musculoskeletal: Negative.    Neurological: Negative for dizziness.        Objective:     /72   Pulse (!) 104   Temp 37.5 °C (99.5 °F)   Resp 16   Ht 1.753 m (5' 9\")   Wt 73 kg (161 lb)   SpO2 93%   BMI 23.78 kg/m²      Physical Exam   Constitutional: He is oriented to person, place, and time. He appears well-developed and well-nourished. No distress.   HENT:   Head: Normocephalic and atraumatic.   Eyes: Pupils are equal, round, and reactive to light.   Neck: Normal range of motion.   Cardiovascular: Normal rate.    Pulmonary/Chest: Effort normal.   Abdominal: Soft. Normal appearance and bowel sounds are normal. He exhibits no distension. There is no tenderness. There is no guarding.   No CVAT bilaterally   Genitourinary:   Genitourinary Comments: Exam deferred   Musculoskeletal: Normal range of motion.        " Lumbar back: He exhibits pain. He exhibits normal range of motion, no tenderness and no bony tenderness.        Back:    Neurological: He is alert and oriented to person, place, and time.   Skin: Skin is warm and dry. He is not diaphoretic.   Psychiatric: He has a normal mood and affect. His behavior is normal.   Nursing note and vitals reviewed.         PMH:  has a past medical history of Anxiety; Depression; Hypertension; and Low back pain.  MEDS:   Current Outpatient Prescriptions:   •  Phenazopyridine HCl (AZO TABS PO), Take  by mouth., Disp: , Rfl:   •  cyclobenzaprine (FLEXERIL) 5 MG tablet, Take 1 Tab by mouth 3 times a day as needed., Disp: 30 Tab, Rfl: 0  •  busPIRone (BUSPAR) 10 MG Tab, Take 10 mg by mouth 2 times a day., Disp: , Rfl:   •  buPROPion (WELLBUTRIN XL) 150 MG XL tablet, Take 150 mg by mouth every morning., Disp: , Rfl:   •  sulfamethoxazole-trimethoprim (BACTRIM DS) 800-160 MG tablet, Take 1 Tab by mouth every 12 hours for 7 days., Disp: 14 Tab, Rfl: 0  •  amlodipine-benazepril (LOTREL) 5-20 MG per capsule, Take 1 Cap by mouth every day., Disp: 30 Cap, Rfl: 6  •  escitalopram (LEXAPRO) 20 MG tablet, Take 1 Tab by mouth every day., Disp: 30 Tab, Rfl: 6  •  diphenhydrAMINE (BENADRYL) 25 MG capsule, Take 1 Cap by mouth 1 time daily as needed (allergy symptoms)., Disp: , Rfl:   ALLERGIES: No Known Allergies  SURGHX: History reviewed. No pertinent surgical history.  SOCHX:  reports that he has never smoked. He has never used smokeless tobacco. He reports that he does not drink alcohol or use drugs.  FH: family history includes Cancer in his maternal grandmother; Cancer (age of onset: 65) in his mother; Hypertension in his mother.       Assessment/Plan:     1. Acute bilateral low back pain without sciatica  - ketorolac (TORADOL) injection 60 mg; 2 mL by Intramuscular route Once.   - Given in clinic with moderate relief of symptoms  - cyclobenzaprine (FLEXERIL) 5 MG tablet; Take 1 Tab by mouth 3  times a day as needed.  Dispense: 30 Tab; Refill: 0   - Will cause sedation, avoid driving, operating heavy machinery, and drinking alcohol    Encouraged icing/heating followed by gentle massage and stretching. OTC nsaids as needed for pain. Call or return to office if symptoms persist or worsen. The patient demonstrated a good understanding and agreed with the treatment plan.    2. Glucosuria  - POCT Glucose --> 121 (non-fasting)    3. Urinary frequency  - REFERRAL TO UROLOGY    Encouraged to continue course of antibiotics until finished. He will follow up with urology for further evaluation and management of urinary symptoms. The patient demonstrated a good understanding and agreed with the treatment plan.

## 2018-08-23 ENCOUNTER — OFFICE VISIT (OUTPATIENT)
Dept: URGENT CARE | Facility: CLINIC | Age: 68
End: 2018-08-23
Payer: MEDICARE

## 2018-08-23 ENCOUNTER — HOSPITAL ENCOUNTER (OUTPATIENT)
Dept: LAB | Facility: MEDICAL CENTER | Age: 68
End: 2018-08-23
Attending: FAMILY MEDICINE
Payer: MEDICARE

## 2018-08-23 ENCOUNTER — TELEPHONE (OUTPATIENT)
Dept: URGENT CARE | Facility: CLINIC | Age: 68
End: 2018-08-23

## 2018-08-23 VITALS
TEMPERATURE: 98.5 F | BODY MASS INDEX: 23.85 KG/M2 | WEIGHT: 161 LBS | SYSTOLIC BLOOD PRESSURE: 116 MMHG | DIASTOLIC BLOOD PRESSURE: 78 MMHG | OXYGEN SATURATION: 98 % | RESPIRATION RATE: 16 BRPM | HEIGHT: 69 IN | HEART RATE: 96 BPM

## 2018-08-23 DIAGNOSIS — L50.9 HIVES: ICD-10-CM

## 2018-08-23 DIAGNOSIS — N17.9 AKI (ACUTE KIDNEY INJURY) (HCC): ICD-10-CM

## 2018-08-23 DIAGNOSIS — N30.00 ACUTE CYSTITIS WITHOUT HEMATURIA: ICD-10-CM

## 2018-08-23 LAB
ANION GAP SERPL CALC-SCNC: 12 MMOL/L (ref 0–11.9)
APPEARANCE UR: NORMAL
BILIRUB UR STRIP-MCNC: NEGATIVE MG/DL
BUN SERPL-MCNC: 75 MG/DL (ref 8–22)
CALCIUM SERPL-MCNC: 8.6 MG/DL (ref 8.5–10.5)
CHLORIDE SERPL-SCNC: 93 MMOL/L (ref 96–112)
CO2 SERPL-SCNC: 18 MMOL/L (ref 20–33)
COLOR UR AUTO: YELLOW
CREAT SERPL-MCNC: 8.62 MG/DL (ref 0.5–1.4)
GLUCOSE SERPL-MCNC: 111 MG/DL (ref 65–99)
GLUCOSE UR STRIP.AUTO-MCNC: 100 MG/DL
KETONES UR STRIP.AUTO-MCNC: NEGATIVE MG/DL
LEUKOCYTE ESTERASE UR QL STRIP.AUTO: NORMAL
NITRITE UR QL STRIP.AUTO: POSITIVE
PH UR STRIP.AUTO: 5 [PH] (ref 5–8)
POTASSIUM SERPL-SCNC: 5.3 MMOL/L (ref 3.6–5.5)
PROT UR QL STRIP: NEGATIVE MG/DL
RBC UR QL AUTO: NORMAL
SODIUM SERPL-SCNC: 123 MMOL/L (ref 135–145)
SP GR UR STRIP.AUTO: 1.01
UROBILINOGEN UR STRIP-MCNC: 0.2 MG/DL

## 2018-08-23 PROCEDURE — 36415 COLL VENOUS BLD VENIPUNCTURE: CPT

## 2018-08-23 PROCEDURE — 99215 OFFICE O/P EST HI 40 MIN: CPT | Performed by: FAMILY MEDICINE

## 2018-08-23 PROCEDURE — 80048 BASIC METABOLIC PNL TOTAL CA: CPT

## 2018-08-23 PROCEDURE — 81002 URINALYSIS NONAUTO W/O SCOPE: CPT | Performed by: FAMILY MEDICINE

## 2018-08-23 RX ORDER — OXYCODONE HYDROCHLORIDE AND ACETAMINOPHEN 5; 325 MG/1; MG/1
1 TABLET ORAL EVERY 4 HOURS PRN
Qty: 4 TAB | Refills: 0 | Status: SHIPPED | OUTPATIENT
Start: 2018-08-23 | End: 2018-08-24

## 2018-08-23 NOTE — PROGRESS NOTES
Subjective:      Chief Complaint   Patient presents with   • Rash     x today, itchy, burns, legs up to neck               Rash  This is a new problem. The current episode started 2 d ago. The problem is unchanged. The rash is diffuse. The rash is characterized by redness, pain,   swelling and itchiness.   Pain is described as an overall soreness, worse with exertion and states pain is severe.      He was recently treated for UTI with bactrim and took the last one yesterday.       He also states that he is producing significantly less urine than is usual for him.           He has no prior hx DM.         He has well-controlled HTN.               Past Medical History:   Diagnosis Date   • Anxiety    • Depression    • Hypertension    • Low back pain          Social History   Substance Use Topics   • Smoking status: Never Smoker   • Smokeless tobacco: Never Used   • Alcohol use No         Current Outpatient Prescriptions on File Prior to Visit   Medication Sig Dispense Refill   • busPIRone (BUSPAR) 10 MG Tab Take 10 mg by mouth 2 times a day.     • buPROPion (WELLBUTRIN XL) 150 MG XL tablet Take 150 mg by mouth every morning.     • amlodipine-benazepril (LOTREL) 5-20 MG per capsule Take 1 Cap by mouth every day. 30 Cap 6   • escitalopram (LEXAPRO) 20 MG tablet Take 1 Tab by mouth every day. 30 Tab 6   • Phenazopyridine HCl (AZO TABS PO) Take  by mouth.     • cyclobenzaprine (FLEXERIL) 5 MG tablet Take 1 Tab by mouth 3 times a day as needed. 30 Tab 0   • diphenhydrAMINE (BENADRYL) 25 MG capsule Take 1 Cap by mouth 1 time daily as needed (allergy symptoms).       No current facility-administered medications on file prior to visit.          Review of Systems   Constitutional: Negative for fever and fatigue.   HENT: Negative for sore throat.    Respiratory: Negative for cough and shortness of breath.    Skin: Positive for rash.   All other systems reviewed and are negative.         Objective:   Blood pressure 116/78, pulse  "96, temperature 36.9 °C (98.5 °F), resp. rate 16, height 1.753 m (5' 9\"), weight 73 kg (161 lb), SpO2 98 %.    Physical Exam   Constitutional: pt is oriented to person, place, and time. Pt appears well-developed and well-nourished. No distress.   HENT:   Head: Normocephalic and atraumatic.   Mouth/Throat: Oropharynx is clear and moist and mucous membranes are normal. No uvula swelling. No oropharyngeal exudate, posterior oropharyngeal edema or posterior oropharyngeal erythema.   Eyes: Conjunctivae are normal.   Cardiovascular: Normal rate, regular rhythm and normal heart sounds.    Pulmonary/Chest: Effort normal and breath sounds normal. No respiratory distress. She has no wheezes.   Neurological: pt is alert and oriented to person, place, and time. No cranial nerve deficit.   Skin: Rash (diffuse evanescent macules and wheals.   ) noted. Pt is not diaphoretic. There is erythema.   Psychiatric: pt's behavior is normal.   Nursing note and vitals reviewed.         Office Visit on 08/23/2018   Component Date Value Ref Range Status   • POC Color 08/23/2018 yellow  Negative Final   • POC Appearance 08/23/2018 cloudy  Negative Final   • POC Leukocyte Esterase 08/23/2018 trace  Negative Final   • POC Nitrites 08/23/2018 positive  Negative Final   • POC Urobiligen 08/23/2018 0.2  Negative (0.2) mg/dL Final   • POC Protein 08/23/2018 negative  Negative mg/dL Final   • POC Urine PH 08/23/2018 5.0  5.0 - 8.0 Final   • POC Blood 08/23/2018 trace-intact  Negative Final   • POC Specific Gravity 08/23/2018 1.010  <1.005 - >1.030 Final   • POC Ketones 08/23/2018 negative  Negative mg/dL Final   • POC Bilirubin 08/23/2018 negative  Negative mg/dL Final   • POC Glucose 08/23/2018 100  Negative mg/dL Final   Hospital Outpatient Visit on 08/21/2018   Component Date Value Ref Range Status   • WBC 08/21/2018 6.4  4.8 - 10.8 K/uL Final   • RBC 08/21/2018 4.65* 4.70 - 6.10 M/uL Final   • Hemoglobin 08/21/2018 13.5* 14.0 - 18.0 g/dL Final "   • Hematocrit 08/21/2018 40.9* 42.0 - 52.0 % Final   • MCV 08/21/2018 88.0  81.4 - 97.8 fL Final   • MCH 08/21/2018 29.0  27.0 - 33.0 pg Final   • MCHC 08/21/2018 33.0* 33.7 - 35.3 g/dL Final   • RDW 08/21/2018 41.0  35.9 - 50.0 fL Final   • Platelet Count 08/21/2018 247  164 - 446 K/uL Final   • MPV 08/21/2018 9.5  9.0 - 12.9 fL Final   • Neutrophils-Polys 08/21/2018 71.20  44.00 - 72.00 % Final   • Lymphocytes 08/21/2018 8.90* 22.00 - 41.00 % Final   • Monocytes 08/21/2018 13.00  0.00 - 13.40 % Final   • Eosinophils 08/21/2018 5.20  0.00 - 6.90 % Final   • Basophils 08/21/2018 0.90  0.00 - 1.80 % Final   • Immature Granulocytes 08/21/2018 0.80  0.00 - 0.90 % Final   • Nucleated RBC 08/21/2018 0.00  /100 WBC Final   • Neutrophils (Absolute) 08/21/2018 4.53  1.82 - 7.42 K/uL Final    Includes immature neutrophils, if present.   • Lymphs (Absolute) 08/21/2018 0.57* 1.00 - 4.80 K/uL Final   • Monos (Absolute) 08/21/2018 0.83  0.00 - 0.85 K/uL Final   • Eos (Absolute) 08/21/2018 0.33  0.00 - 0.51 K/uL Final   • Baso (Absolute) 08/21/2018 0.06  0.00 - 0.12 K/uL Final   • Immature Granulocytes (abs) 08/21/2018 0.05  0.00 - 0.11 K/uL Final   • NRBC (Absolute) 08/21/2018 0.00  K/uL Final   • GFR If  08/21/2018 15* >60 mL/min/1.73 m 2 Final   • GFR If Non  08/21/2018 13* >60 mL/min/1.73 m 2 Final   • Sodium 08/21/2018 134* 135 - 145 mmol/L Final   • Potassium 08/21/2018 4.3  3.6 - 5.5 mmol/L Final   • Chloride 08/21/2018 99  96 - 112 mmol/L Final   • Co2 08/21/2018 23  20 - 33 mmol/L Final   • Anion Gap 08/21/2018 12.0* 0.0 - 11.9 Final   • Glucose 08/21/2018 92  65 - 99 mg/dL Final   • Bun 08/21/2018 45* 8 - 22 mg/dL Final   • Creatinine 08/21/2018 4.70* 0.50 - 1.40 mg/dL Final   • Calcium 08/21/2018 8.7  8.5 - 10.5 mg/dL Final   • AST(SGOT) 08/21/2018 23  12 - 45 U/L Final   • ALT(SGPT) 08/21/2018 23  2 - 50 U/L Final   • Alkaline Phosphatase 08/21/2018 77  30 - 99 U/L Final   • Total  Bilirubin 08/21/2018 0.4  0.1 - 1.5 mg/dL Final   • Albumin 08/21/2018 3.6  3.2 - 4.9 g/dL Final   • Total Protein 08/21/2018 5.9* 6.0 - 8.2 g/dL Final   • Globulin 08/21/2018 2.3  1.9 - 3.5 g/dL Final   • A-G Ratio 08/21/2018 1.6  g/dL Final   • Cholesterol,Tot 08/21/2018 124  100 - 199 mg/dL Final   • Triglycerides 08/21/2018 77  0 - 149 mg/dL Final   • HDL 08/21/2018 42  >=40 mg/dL Final   • LDL 08/21/2018 67  <100 mg/dL Final   • Hepatitis C Antibody 08/21/2018 Negative  Negative Final    Comment: The [employer] Centaur HCV assay is limited to the detection of IgG  antibodies to hepatitis C virus in human serum.  The results  from this or any other diagnostic kit should be used and interpreted  only in the context of the overall clinical picture.  A negative  test result does not exclude the possibility of exposure to  hepatitis C virus.     • Prostatic Specific Antigen Tot 08/21/2018 3.98  0.00 - 4.00 ng/mL Final    Comment: The Access [employer]IQbritech PSA assay is a paramagnetic particle,  chemiluminescent immunoassay for the quantitative determination  of total prostate specific antigen (PSA) levels using the  [employer] Immunoassay System. Values obtained with different  methods cannot be used interchangeably for patient monitoring.     • Free T-4 08/21/2018 0.99  0.53 - 1.43 ng/dL Final   • TSH 08/21/2018 1.740  0.380 - 5.330 uIU/mL Final    Comment: Please note new reference ranges effective 12/14/2017 10:00 AM  Pregnant Females, 1st Trimester  0.050-3.700  Pregnant Females, 2nd Trimester  0.310-4.350  Pregnant Females, 3rd Trimester  0.410-5.180     • Glycohemoglobin 08/21/2018 5.5  0.0 - 5.6 % Final    Comment: Increased risk for diabetes:  5.7 -6.4%  Diabetes:  >6.4%  Glycemic control for adults with diabetes:  <7.0%  The above interpretations are per ADA guidelines.  Diagnosis  of diabetes mellitus on the basis of elevated Hemoglobin A1c  should be confirmed by repeating the Hb A1c test.     • Est Avg Glucose  08/21/2018 111  mg/dL Final    Comment: The eAG calculation is based on the A1c-Derived Daily Glucose  (ADAG) study.  See the ADA's website for additional information.     Office Visit on 08/19/2018   Component Date Value Ref Range Status   • Glucose - Accu-Ck 08/19/2018 121* 70 - 100 mg/dL Final   Hospital Outpatient Visit on 08/15/2018   Component Date Value Ref Range Status   • Significant Indicator 08/15/2018 NEG   Final   • Source 08/15/2018 UR   Final   • Urine Culture 08/15/2018 No growth at 48 hours.   Final   • Source 08/15/2018 Cervical   Final   • C. trachomatis by PCR 08/15/2018 Negative  Negative Final   • N. gonorrhoeae by PCR 08/15/2018 Negative  Negative Final   Office Visit on 08/15/2018   Component Date Value Ref Range Status   • POC Color 08/15/2018 yellow  Negative Final   • POC Appearance 08/15/2018 clear  Negative Final   • POC Leukocyte Esterase 08/15/2018 negative  Negative Final   • POC Nitrites 08/15/2018 negative  Negative Final   • POC Urobiligen 08/15/2018 0.2  Negative (0.2) mg/dL Final   • POC Protein 08/15/2018 negative  Negative mg/dL Final   • POC Urine PH 08/15/2018 7.0  5.0 - 8.0 Final   • POC Blood 08/15/2018 negative  Negative Final   • POC Specific Gravity 08/15/2018 1.015  <1.005 - >1.030 Final   • POC Ketones 08/15/2018 negative  Negative mg/dL Final   • POC Bilirubin 08/15/2018 negative  Negative mg/dL Final   • POC Glucose 08/15/2018 negative  Negative mg/dL Final            Lab Results   Component Value Date/Time    POCCOLOR yellow 08/23/2018 02:23 PM    POCAPPEAR cloudy 08/23/2018 02:23 PM    POCLEUKEST trace 08/23/2018 02:23 PM    POCNITRITE positive 08/23/2018 02:23 PM    POCUROBILIGE 0.2 08/23/2018 02:23 PM    POCPROTEIN negative 08/23/2018 02:23 PM    POCURPH 5.0 08/23/2018 02:23 PM    POCBLOOD trace-intact 08/23/2018 02:23 PM    POCSPGRV 1.010 08/23/2018 02:23 PM    POCKETONES negative 08/23/2018 02:23 PM    POCBILIRUBIN negative 08/23/2018 02:23 PM    POCGLUCUA 100  08/23/2018 02:23 PM        Assessment/Plan:       KANDI (acute kidney injury) (HCC)    I believe that his rash represents uremia, rather that medication reaction to the bactrim.        BUN/Cr - 45/4.7 and GFR of 13, which makes him a candidate for dialysis.       I had a long discussion with pt regarding the lab findings and his current sx.    I discussed the case with Dr. Levin, who was covering for his PCP.    We both believe that he is in kidney failure and requires a higher level of care.        He is refusing to go to ER and is only requesting something for pain.    He desires only to f/u with his PCP tomorrow.   I strongly advised against this and informed him of potential serious and fatal complications of not properly treating severe kidney failure.            No NSAIDs for pain.   Rx percocet       His UA was positive for LE, Blood, Nitrates.    Unable to give enough for culture.

## 2018-08-24 ENCOUNTER — HOSPITAL ENCOUNTER (EMERGENCY)
Facility: MEDICAL CENTER | Age: 68
End: 2018-08-24
Attending: EMERGENCY MEDICINE
Payer: MEDICARE

## 2018-08-24 ENCOUNTER — APPOINTMENT (OUTPATIENT)
Dept: RADIOLOGY | Facility: MEDICAL CENTER | Age: 68
End: 2018-08-24
Attending: EMERGENCY MEDICINE
Payer: MEDICARE

## 2018-08-24 VITALS
OXYGEN SATURATION: 95 % | BODY MASS INDEX: 28.94 KG/M2 | HEART RATE: 74 BPM | SYSTOLIC BLOOD PRESSURE: 128 MMHG | DIASTOLIC BLOOD PRESSURE: 80 MMHG | HEIGHT: 68 IN | WEIGHT: 190.92 LBS | RESPIRATION RATE: 16 BRPM | TEMPERATURE: 97.3 F

## 2018-08-24 DIAGNOSIS — E87.1 HYPONATREMIA: ICD-10-CM

## 2018-08-24 DIAGNOSIS — N17.9 ACUTE RENAL FAILURE, UNSPECIFIED ACUTE RENAL FAILURE TYPE (HCC): ICD-10-CM

## 2018-08-24 DIAGNOSIS — E87.5 HYPERKALEMIA: ICD-10-CM

## 2018-08-24 LAB
ALBUMIN SERPL BCP-MCNC: 3.4 G/DL (ref 3.2–4.9)
ALBUMIN/GLOB SERPL: 1.5 G/DL
ALP SERPL-CCNC: 83 U/L (ref 30–99)
ALT SERPL-CCNC: 39 U/L (ref 2–50)
ANION GAP SERPL CALC-SCNC: 13 MMOL/L (ref 0–11.9)
ANISOCYTOSIS BLD QL SMEAR: ABNORMAL
AST SERPL-CCNC: 33 U/L (ref 12–45)
BASOPHILS # BLD AUTO: 0 % (ref 0–1.8)
BASOPHILS # BLD: 0 K/UL (ref 0–0.12)
BILIRUB SERPL-MCNC: 0.4 MG/DL (ref 0.1–1.5)
BUN SERPL-MCNC: 82 MG/DL (ref 8–22)
CALCIUM SERPL-MCNC: 8.4 MG/DL (ref 8.5–10.5)
CHLORIDE SERPL-SCNC: 89 MMOL/L (ref 96–112)
CO2 SERPL-SCNC: 18 MMOL/L (ref 20–33)
CREAT SERPL-MCNC: 9.52 MG/DL (ref 0.5–1.4)
EOSINOPHIL # BLD AUTO: 0.46 K/UL (ref 0–0.51)
EOSINOPHIL NFR BLD: 6.2 % (ref 0–6.9)
ERYTHROCYTE [DISTWIDTH] IN BLOOD BY AUTOMATED COUNT: 38.5 FL (ref 35.9–50)
GLOBULIN SER CALC-MCNC: 2.3 G/DL (ref 1.9–3.5)
GLUCOSE SERPL-MCNC: 106 MG/DL (ref 65–99)
HCT VFR BLD AUTO: 36.4 % (ref 42–52)
HGB BLD-MCNC: 12.3 G/DL (ref 14–18)
LYMPHOCYTES # BLD AUTO: 1.05 K/UL (ref 1–4.8)
LYMPHOCYTES NFR BLD: 14.2 % (ref 22–41)
MANUAL DIFF BLD: NORMAL
MCH RBC QN AUTO: 28.4 PG (ref 27–33)
MCHC RBC AUTO-ENTMCNC: 33.8 G/DL (ref 33.7–35.3)
MCV RBC AUTO: 84.1 FL (ref 81.4–97.8)
METAMYELOCYTES NFR BLD MANUAL: 0.9 %
MICROCYTES BLD QL SMEAR: ABNORMAL
MONOCYTES # BLD AUTO: 0.26 K/UL (ref 0–0.85)
MONOCYTES NFR BLD AUTO: 3.5 % (ref 0–13.4)
MORPHOLOGY BLD-IMP: NORMAL
NEUTROPHILS # BLD AUTO: 5.56 K/UL (ref 1.82–7.42)
NEUTROPHILS NFR BLD: 71.7 % (ref 44–72)
NEUTS BAND NFR BLD MANUAL: 3.5 % (ref 0–10)
NRBC # BLD AUTO: 0 K/UL
NRBC BLD-RTO: 0 /100 WBC
OVALOCYTES BLD QL SMEAR: NORMAL
PLATELET # BLD AUTO: 125 K/UL (ref 164–446)
PLATELET BLD QL SMEAR: NORMAL
PMV BLD AUTO: 9.2 FL (ref 9–12.9)
POIKILOCYTOSIS BLD QL SMEAR: NORMAL
POTASSIUM SERPL-SCNC: 5.9 MMOL/L (ref 3.6–5.5)
PROT SERPL-MCNC: 5.7 G/DL (ref 6–8.2)
RBC # BLD AUTO: 4.33 M/UL (ref 4.7–6.1)
RBC BLD AUTO: PRESENT
SODIUM SERPL-SCNC: 120 MMOL/L (ref 135–145)
WBC # BLD AUTO: 7.4 K/UL (ref 4.8–10.8)

## 2018-08-24 PROCEDURE — 85027 COMPLETE CBC AUTOMATED: CPT

## 2018-08-24 PROCEDURE — 99284 EMERGENCY DEPT VISIT MOD MDM: CPT

## 2018-08-24 PROCEDURE — 700111 HCHG RX REV CODE 636 W/ 250 OVERRIDE (IP): Performed by: EMERGENCY MEDICINE

## 2018-08-24 PROCEDURE — 85007 BL SMEAR W/DIFF WBC COUNT: CPT

## 2018-08-24 PROCEDURE — 36415 COLL VENOUS BLD VENIPUNCTURE: CPT

## 2018-08-24 PROCEDURE — 99285 EMERGENCY DEPT VISIT HI MDM: CPT | Performed by: INTERNAL MEDICINE

## 2018-08-24 PROCEDURE — 76775 US EXAM ABDO BACK WALL LIM: CPT

## 2018-08-24 PROCEDURE — 99285 EMERGENCY DEPT VISIT HI MDM: CPT | Performed by: HOSPITALIST

## 2018-08-24 PROCEDURE — 96374 THER/PROPH/DIAG INJ IV PUSH: CPT | Mod: XU

## 2018-08-24 PROCEDURE — 80053 COMPREHEN METABOLIC PANEL: CPT

## 2018-08-24 RX ORDER — PROPRANOLOL HYDROCHLORIDE 20 MG/1
20 TABLET ORAL DAILY
COMMUNITY

## 2018-08-24 RX ADMIN — SODIUM BICARBONATE 50 MEQ: 84 INJECTION, SOLUTION INTRAVENOUS at 12:08

## 2018-08-24 NOTE — ED NOTES
The Medication Reconciliation process has been completed by interviewing the patient    Allergies have been reviewed  Antibiotic use in 30 days - none    Home Pharm[facility]:  [facility]

## 2018-08-24 NOTE — ED NOTES
Patient's significant other was called by Charge ROCIO Barth, he advised that the patient will not be returning. Both the patient and his SO are aware of the life threatening risks to not coming back to the ER for admission and treatment after discussing this with ERP, nephrologist, and hospitalist. Pt to be discharged.

## 2018-08-24 NOTE — ED TRIAGE NOTES
".  Chief Complaint   Patient presents with   • Sent from Urgent Care     for kidney failure   • Rash     x 2 days, itchy, painful     /80   Pulse 83   Temp 36.3 °C (97.3 °F) (Temporal)   Resp 16   Ht 1.727 m (5' 8\")   Wt 86.6 kg (190 lb 14.7 oz)   SpO2 98%   BMI 29.03 kg/m²     Ambulatory to triage with above, labs done yesterday with critical Creatinine and Bun, GFR 6, educated on triage process, given urine specimen cup and clean catch instructions, placed in lobby with family member, told to inform staff of any changes in condition.    "

## 2018-08-24 NOTE — ED NOTES
Report from Lary RN; assumed care of patient at this time. Patient is aware after speaking to admitting MD, nephrologist, and ERP of risks of leaving AMA, however patient has decided to leave anyway to go take care of his pets and advises he will be back in 30 mins. PIV discontinued prior to leaving. Pt ambulatory w/ steady gait upon leaving; Charge RN and ERP aware of situation.

## 2018-08-24 NOTE — ED PROVIDER NOTES
ED Provider Note    Scribed for Luis Arguelles M.D. by Sis Mancia. 8/24/2018  9:57 AM    Primary care provider: ARELI Vargas  Means of arrival: Walk in  History obtained from: Patient  History limited by: None    CHIEF COMPLAINT  •  Rash  •  Abnormal Lab Results    HPI  Gunnar Mcneil is a 67 y.o. male who presents to the Emergency Department for a rash and abnormal lab results with an onset of 1 day. Patient woke up yesterday morning with an erythematous rash to his abdomen, back and bilateral upper thighs. No alleviating factors were reported. He is unable to identify a possible trigger for the rash including medications, foods, lotions or detergents. His rash was evaluated at an urgent care yesterday and had repeat lab testing after they noted he had abnormal lab results on 8/21/18.  His lab results were once again abnormal yesterday indicating kidney failure. He was called this morning and asked to proceed to the ED. No prior history of kidney disease. Negative for fevers, chills, cough, sore throat, chest pain, shortness of breath, abdominal pain, nausea, vomiting, urinary frequency, hematuria or dysuria.       REVIEW OF SYSTEMS  Pertinent positives include: erythematous rash to abdomen, back and bilateral upper thighs, abnormal lab results and kidney failure.  Pertinent negatives include: fevers, chills, cough, sore throat, chest pain, shortness of breath, abdominal pain, nausea, vomiting, urinary frequency, hematuria or dysuria.  10+ systems reviewed and negative. See HPI for further details. C.      PAST MEDICAL HISTORY  Past Medical History:   Diagnosis Date   • Anxiety    • Depression    • Hypertension    • Low back pain    No prior history of kidney disease.      FAMILY HISTORY  Family History   Problem Relation Age of Onset   • Cancer Mother 65        breast   • Hypertension Mother    • Cancer Maternal Grandmother         lung       SOCIAL HISTORY  Social History   Substance Use  "Topics   • Smoking status: Never Smoker   • Smokeless tobacco: Never Used   • Alcohol use No     History   Drug Use No       SURGICAL HISTORY  History reviewed. No pertinent surgical history.      CURRENT MEDICATIONS  Home Medications     Reviewed by Mary Frost (Pharmacy Tech) on 08/24/18 at [facility]  Med List Status: Complete   Medication Last Dose Status   amlodipine-benazepril (LOTREL) 5-20 MG per capsule 8/24/2018 Active   buPROPion (WELLBUTRIN XL) 150 MG XL tablet 8/24/2018 Active   busPIRone (BUSPAR) 10 MG Tab 8/24/2018 Active   cyclobenzaprine (FLEXERIL) 5 MG tablet 8/23/2018 Active   diphenhydrAMINE (BENADRYL) 25 MG capsule 8/24/2018 Active   escitalopram (LEXAPRO) 20 MG tablet 8/24/2018 Active   Phenazopyridine HCl (AZO TABS PO) 8/19/2018 Active   propranolol (INDERAL) 20 MG Tab 8/24/2018 Active                ALLERGIES  Allergies   Allergen Reactions   • Bactrim [Sulfamethoxazole W-Trimethoprim]        PHYSICAL EXAM  VITAL SIGNS: /80   Pulse 83   Temp 36.3 °C (97.3 °F) (Temporal)   Resp 16   Ht 1.727 m (5' 8\")   Wt 86.6 kg (190 lb 14.7 oz)   SpO2 98%   BMI 29.03 kg/m²   Reviewed and borderline blood pressure elevation    Constitutional: Well developed, Well nourished.  HENT: Normocephalic, atraumatic, bilateral external ears normal, oropharynx moist, No exudates or erythema.   Eyes: PERRLA, conjunctiva pink, no scleral icterus.   Cardiovascular: Regular rate and rhythm. No murmurs, rubs or gallops.   Respiratory: Lungs clear to auscultation bilaterally. No wheezes, rales, or rhonchi.   Abdominal:  Abdomen soft, non-tender, non distended. No rebound, or guarding.    Skin: Patchy macular pink rash, confluent and symmetric to abdomen and legs primarily above knees. Faint rash to torso.  Genitourinary: No costovertebral angle tenderness.   Musculoskeletal: No edema.   Neurologic: Alert & oriented x 3, cranial nerves 2-12 intact by passive exam.  No focal deficit noted.  Psychiatric: Affect " normal, Judgment normal, Mood normal.       DIFFERENTIAL DIAGNOSIS:  Acute renal failure, dehydration, intrinsic renal failure, post renal obstruction, pyelonephritis, hypertension.      RADIOLOGY/PROCEDURES  US-RENAL   Final Result      Mild/moderate bilateral hydronephrosis.   0.7 cm complicated left renal cyst containing wall calcifications.        Radiologist interpretation have been reviewed by me.       LABORATORY:  Results for orders placed or performed during the hospital encounter of 08/24/18   CBC WITH DIFFERENTIAL   Result Value Ref Range    WBC 7.4 4.8 - 10.8 K/uL    RBC 4.33 (L) 4.70 - 6.10 M/uL    Hemoglobin 12.3 (L) 14.0 - 18.0 g/dL    Hematocrit 36.4 (L) 42.0 - 52.0 %    MCV 84.1 81.4 - 97.8 fL    MCH 28.4 27.0 - 33.0 pg    MCHC 33.8 33.7 - 35.3 g/dL    RDW 38.5 35.9 - 50.0 fL    Platelet Count 125 (L) 164 - 446 K/uL    MPV 9.2 9.0 - 12.9 fL    Neutrophils-Polys 71.70 44.00 - 72.00 %    Lymphocytes 14.20 (L) 22.00 - 41.00 %    Monocytes 3.50 0.00 - 13.40 %    Eosinophils 6.20 0.00 - 6.90 %    Basophils 0.00 0.00 - 1.80 %    Nucleated RBC 0.00 /100 WBC    Neutrophils (Absolute) 5.56 1.82 - 7.42 K/uL    Lymphs (Absolute) 1.05 1.00 - 4.80 K/uL    Monos (Absolute) 0.26 0.00 - 0.85 K/uL    Eos (Absolute) 0.46 0.00 - 0.51 K/uL    Baso (Absolute) 0.00 0.00 - 0.12 K/uL    NRBC (Absolute) 0.00 K/uL    Anisocytosis 1+     Microcytosis 1+    COMP METABOLIC PANEL   Result Value Ref Range    Sodium 120 (L) 135 - 145 mmol/L    Potassium 5.9 (H) 3.6 - 5.5 mmol/L    Chloride 89 (L) 96 - 112 mmol/L    Co2 18 (L) 20 - 33 mmol/L    Anion Gap 13.0 (H) 0.0 - 11.9    Glucose 106 (H) 65 - 99 mg/dL    Bun 82 (HH) 8 - 22 mg/dL    Creatinine 9.52 (HH) 0.50 - 1.40 mg/dL    Calcium 8.4 (L) 8.5 - 10.5 mg/dL    AST(SGOT) 33 12 - 45 U/L    ALT(SGPT) 39 2 - 50 U/L    Alkaline Phosphatase 83 30 - 99 U/L    Total Bilirubin 0.4 0.1 - 1.5 mg/dL    Albumin 3.4 3.2 - 4.9 g/dL    Total Protein 5.7 (L) 6.0 - 8.2 g/dL    Globulin 2.3 1.9 -  3.5 g/dL    A-G Ratio 1.5 g/dL      Lab results reviewed by me.       INTERVENTIONS:  Medications   sodium bicarbonate 8.4 % injection 50 mEq (not administered)       ED COURSE:  Pertinent Labs & Imaging studies reviewed. (See chart for details)    9:50 AM Obtained and reviewed patient's electronic medical record which indicates labs completed on 8/21/18 with a BUN of 45; creatinine of 4.70. Labs completed on 8/23/18 show a BUN of 75 and creatinine of 8.62.     9:58 AM Patient seen and examined at bedside. Patient presents for a rash and abnormal lab results.     Initial orders in the Emergency Department included US renal and laboratory testing: CBC with differential, CMP, estimated GFR, urine culture and UA.      10:38 AM Further labs were ordered to evaluate his symptoms including differential manual, peripheral smear review, platelet estimate and morphology.    11:45 AM Labs indicate hyponatremia. Patient will be treated with 50 mEq of Sodium bicarbonate IV.    11:47 AM Paged Hospitalist and Nephrology.    11:49 AM Spoke with Dr. Lilian Velez, Hospitalist, regarding the patient's acute onset of kidney failure and lab results from today's evaluation. He will consult and admit the patient for further evaluation and care. Plan to consult with Nephrology.    12:02 PM On repeat evaluation, lab results were reviewed with the patient. Hyperkalemia. Patient was asked to stop taking potassium supplements. US results were discussed. Nursing staff will place a marie catheter. Plan for admission.    12:14 PM Consulted with Dr. Najjar, Nephrology, regarding the patient's lab results. Plan for admission. He will consult.    MEDICAL DECISION MAKING:  This patient presents with acute renal failure of unclear etiology.  He has a history of underlying hypertension but not diabetes.  UTI will be ruled out with urinalysis.  Ultrasound suggests post renal obstruction it may be a complicating factor.      PLAN:  Admission for nephrology  consult with Dr. Foster, possible dialysis, Buchanan catheter placement, further workup of possible renal obstruction, urinalysis.    Prior to admission the patient stated he had to leave the hospital to take care of his pets and that he would return.  I told him he was at risk of life-threatening arrhythmia and he verbalized understanding.  He said he would return in 30 minutes.  He could not be dissuaded from leaving the hospital despite asking social work to enlist the help of animal control on his behalf.  Dr. Barnes will admit the patient upon his return.      CONDITION: Patient will be admitted by Dr. Lilian Velez, Hospitalist.         FINAL IMPRESSION  1. Acute renal failure, unspecified acute renal failure type (HCC)    2. Hyperkalemia    3. Hyponatremia         Sis KNOTT (Scribe), am scribing for, and in the presence of, Luis Arguelles M.D.    Electronically signed by: Sis Mancia (Scribe), 8/24/2018    ILuis M.D. personally performed the services described in this documentation, as scribed by Sis Mancia in my presence, and it is both accurate and complete.    The note accurately reflects work and decisions made by me.  Luis Arguelles  8/24/2018  1:09 PM

## 2018-08-24 NOTE — TELEPHONE ENCOUNTER
"Labs reviewed with pt via phone:      Kidney function continues to decline.     I tried my best to explain to the patient the seriousness of his situation and that he needs to be hospitalized immediately.    He states that he will go to the hospital once he arranges accomodation for his pets, \"maybe tomorrow\".   I strongly advised him against this, as he appears to be in severe kidney failure and will likely require urgent dialysis at this point.     Despite my best efforts to convince him otherwise, he refuses to go to the ER at this time.              Hospital Outpatient Visit on 08/23/2018   Component Date Value Ref Range Status   • Sodium 08/23/2018 123* 135 - 145 mmol/L Final   • Potassium 08/23/2018 5.3  3.6 - 5.5 mmol/L Final   • Chloride 08/23/2018 93* 96 - 112 mmol/L Final   • Co2 08/23/2018 18* 20 - 33 mmol/L Final   • Glucose 08/23/2018 111* 65 - 99 mg/dL Final   • Bun 08/23/2018 75* 8 - 22 mg/dL Final   • Creatinine 08/23/2018 8.62* 0.50 - 1.40 mg/dL Final   • Calcium 08/23/2018 8.6  8.5 - 10.5 mg/dL Final   • Anion Gap 08/23/2018 12.0* 0.0 - 11.9 Final   • GFR If  08/23/2018 8* >60 mL/min/1.73 m 2 Final   • GFR If Non  08/23/2018 6* >60 mL/min/1.73 m 2 Final   Office Visit on 08/23/2018   Component Date Value Ref Range Status   • POC Color 08/23/2018 yellow  Negative Final   • POC Appearance 08/23/2018 cloudy  Negative Final   • POC Leukocyte Esterase 08/23/2018 trace  Negative Final   • POC Nitrites 08/23/2018 positive  Negative Final   • POC Urobiligen 08/23/2018 0.2  Negative (0.2) mg/dL Final   • POC Protein 08/23/2018 negative  Negative mg/dL Final   • POC Urine PH 08/23/2018 5.0  5.0 - 8.0 Final   • POC Blood 08/23/2018 trace-intact  Negative Final   • POC Specific Gravity 08/23/2018 1.010  <1.005 - >1.030 Final   • POC Ketones 08/23/2018 negative  Negative mg/dL Final   • POC Bilirubin 08/23/2018 negative  Negative mg/dL Final   • POC Glucose 08/23/2018 100  " Negative mg/dL Final

## 2018-08-24 NOTE — ED NOTES
MD/Hospitalist/nephrologist at bedside. pt requesting to leave ED and return in 30 min. Pt states he is concerned with the care of his animals and needs to leave but will return to be admitted.  contacted and explained to pt that animal control would help with care of his animals however pt continues to states he wants to leave ama.

## 2018-08-25 NOTE — CONSULTS
DATE OF SERVICE:  08/24/2018    REQUESTING PHYSICIAN:  Dr. Luis Arguelles from the emergency room.    REASON FOR CONSULTATION:  Evaluate for admission for renal failure.    PRIMARY CARE PROVIDER:  ESEQUIEL Devries    HISTORY OF PRESENT ILLNESS:  The patient is a 67-year-old male who presented   to the emergency room for evaluation of rash and abnormal blood work.  He   reports that he woke up yesterday with a generalized rash on his abdomen, back   and lower extremities, which progressively got worse.  He had some mild   itching associated with it.  He has not had any recent changes or triggers   that he could recall.  He was seen at the urgent care yesterday and had basic   labs revealing renal failure.  He was called this morning and asked to present   to the emergency room for further evaluation.  The patient denies any   previous history of kidney disease.  He denies any fever or chills.  He is   still making urine.  He denies any hematuria.  He denies any dysuria.  He   denies any fever or chills.  He denies any chest pain.  He denies any dyspnea.    He denies any cough or hemoptysis.  He denies any epistaxis or sinus   pressure.  He denies any abnormal weight loss.  He denies any night sweats.    REVIEW OF SYSTEMS:  As above.  All other systems reviewed and negative.    PAST MEDICAL HISTORY:  Significant for anxiety, depression, hypertension.    PAST SURGICAL HISTORY:  Negative per patient.    SOCIAL HISTORY:  He does not smoke.  No alcohol or illicit drug use.    FAMILY HISTORY:  Reviewed, and not pertinent to the presenting problem.    HOME MEDICATIONS:  Lotrel 5/20 p.o. daily, Wellbutrin- mg daily, BuSpar   10 mg 3 times a day, Flexeril 5 mg 3 times a day as needed, Benadryl 25 mg as   needed, Lexapro 20 mg daily, propranolol 20 mg daily.    PHYSICAL EXAMINATION:  GENERAL:  He is alert, oriented x3.  VITAL SIGNS:  Temperature is 36.3, pulse is 83, respiratory rate 16, blood   pressure 128/80 and  pulse oximetry is 98%.  HEAD AND NECK:  Pupils equal.  Supple neck.  No jugular venous distention.    Oropharynx is clear.  No cervical lymphadenopathy.  HEART:  Regular rate and rhythm, normal S1, S2.  No murmurs, rubs or gallops.  LUNGS:  Clear with symmetric air entry bilaterally.  No chest wall tenderness.  ABDOMEN:  Soft, nontender.  Bowel sounds are positive.  No hepatosplenomegaly.  EXTREMITIES:  +1 edema, no clubbing, no cyanosis.  NEUROLOGIC:  No focal deficits.  SKIN:  He has a generalized macular rash erythematous.    DIAGNOSTICS:  White blood cell count 7.4, hemoglobin 12.3, hematocrit 36.4 and   platelet count 125.  Sodium 120, potassium is 5.9, chloride 89, bicarbonate   18, glucose 106, BUN 82 and creatinine 9.52.  Calcium 8.4, total protein 5.7,   globulin 2.3.  Of note in 02/2018, his serum creatinine was 0.83.    Urinalysis on 08/23rd was negative for proteins, positive for nitrites, trace   leukocyte esterase.  PSA on 08/21 was 3.98.  TSH 1.7.  Hep C antibiotic on   08/21 was negative.  Renal ultrasound revealed mild to moderate bilateral   hydronephrosis, 0.7 cm complicated left renal cyst, containing wall   calcification.    ASSESSMENT AND PLAN:  1.  Acute renal failure with mild-to-moderate hydronephrosis on ultrasound,   suspect obstructive etiology.  2.  Hypertension.  3.  Generalized rash, etiology is not entirely clear.  4.  Anxiety.  5.  Hyperkalemia.  6.  Hyponatremia,    RECOMMENDATIONS:  Recommended that the patient be admitted, have a Buchanan   catheter replaced and urology consultation and nephrology consultation.    Recommended that the patient be started on IV fluids for hydration with close   monitoring of his sodium.    The patient has received bicarbonate for his hyperkalemia and he will need   close monitoring of his electrolytes and renal function.    I also discussed the case with Dr. Najjar who also agreed with those   recommendations.    The patient understood those  recommendations; however, he insisted on leaving   the hospital as he stated that he needs to take care of his pets and get them   water and food and get arrangements for someone to take care of them.  I   refute with him that he is at risk of having sudden death from electrolyte   abnormalities and progressive renal failure if he does not get the above   recommended treatments.  He understood my recommendation; however, he still   insisted on leaving.  He stated that he will be coming back later today.  The   risks of leaving against medical advice were also reiterated to him by the   emergency room physician and by Dr. Najjar.  He understood those risks, but   still insisted on leaving stating that he will come back.       ____________________________________     MD SANDY SANTO / NTS    DD:  08/24/2018 17:17:31  DT:  08/24/2018 18:17:48    D#:  [id number]  Job#:  [id number]    cc: Tracie IRAHETA      Addendum:    Patient did not present back to the emergency room as he has stated prior to leaving AMA to take care of his pets.  I called him at his listed number he reports that he changes mind and would consider coming back tomorrow.  I reviewed with him the severity of his illness I discussed that his renal failure is likely to progress and that he is at high risk of death secondary to arrhythmias from hyperkalemia.  I discussed with him that his renal failure is likely to be reversible with hydration and relief of urinary obstruction with Buchanan catheter, and reiterated to him the importance of seeking urgent medical care by coming back to the emergency room.  He understands the risks of not seeking urgent medical care including dying from his heart stopping but wants to think more about it before making decision to return to the emergency room.

## 2018-08-25 NOTE — CONSULTS
DATE OF SERVICE:  08/24/2018    REQUESTING PHYSICIAN:  Dr. Arguelles from the emergency room service.    REASON FOR CONSULTATION:  Acute kidney injury.    The patient is seen and examined, medical records were reviewed.    HISTORY OF PRESENT ILLNESS:  The patient is an unfortunate 67-year-old   gentleman with past medical history significant for longstanding hypertension,   history of anxiety, presented to the hospital with abnormal labs as well as   generalized rash.  His rash started yesterday.  He noticed an erythematous   rash all over his abdomen and lower extremities.  There is mild pruritus.    There is no identifiable reason.  He was evaluated in the urgent care and they   did a blood test which showed acute kidney injury with a creatinine at 8.62.    His baseline creatinine from February was 0.83, today he came to the   emergency room and his creatinine was at 9.52; his potassium 5.9.  Patient had   a renal ultrasound, which I reviewed the image myself, showed moderate   bilateral hydronephrosis.  Patient has no hematuria, no dysuria, and no recent   use of NSAIDs or IV contrast.    PAST MEDICAL HISTORY:  1.  Hypertension.  2.  Anxiety.    ALLERGIES:  The list was reviewed.    MEDICATIONS:  Reviewed.    SOCIAL HISTORY:  Patient has no smoking or alcohol use.    FAMILY HISTORY:  No known renal disease.    REVIEW OF SYSTEMS:  All systems were reviewed and they were negative except as   outlined in the history of present illness.    PHYSICAL EXAMINATION:  GENERAL:  Patient is in no apparent distress.  VITAL SIGNS:  Showed blood pressure of 120/80, heart rate was 74, respiratory   rate was 16.  HEENT:  Normocephalic, atraumatic.  Sclerae is anicteric.  His pupils are   round and reactive. Nose is normal.  Mucous membranes are moist.  NECK:  No lymphadenopathy, no JVD, no thyroid mass.  CHEST:  Normal.  LUNGS:  Clear to auscultation.  No wheezes or crackles.  HEART:  S1, S2.  ABDOMEN:  Soft, nontender.  No  hepatosplenomegaly.  EXTREMITIES:  There is no lower extremity edema.  His pedal pulses were   intact.  Patient has no inguinal lymphadenopathy.  SKIN:  He has generalized patchy macular pink rash all over his body.  NEUROLOGIC:  Patient is alert and oriented x3.  Mood is normal.    LABORATORY DATA:  His recent labs from today were reviewed.  Also, I reviewed   the renal ultrasound image.    ASSESSMENT:  1.  Acute kidney injury.  The etiology is most likely obstructive uropathy.  2.  Hyponatremia.  3.  Hypercalcemia.  4.  Metabolic acidosis.  5.  Anemia.  6.  Rash of unclear etiology.    PLAN:  1.  There is no acute need for renal replacement therapy.  2.  We will consult Urology.  3.  Place a Buchanan catheter.  4.  If there is no significant urine output, we might consider putting   bilateral nephrostomy tube.  5.  Low potassium diet.  6.  Sodium bicarbonate to manage his hyperkalemia as well as metabolic   acidosis.  7.  Serial kidney function tests.  8.  If there is no improvement in next 24 hours, we will consider dialysis.    Of note, the patient is insistent to go home to take care of his pets.  He   understands the risk and benefit and he signed against medical advice.  He   said he will come back in an hour and we will initiate the treatment then.    Again the patient understands the risks of his decision.  Plan discussed in   detail with Dr. Lilian Velez.       ____________________________________     FADI NAJJAR, MD FN / RUTH    DD:  08/24/2018 17:24:49  DT:  08/24/2018 18:45:36    D#:  [id number]  Job#:  [id number]

## 2018-08-26 ENCOUNTER — HOSPITAL ENCOUNTER (INPATIENT)
Facility: MEDICAL CENTER | Age: 68
LOS: 3 days | DRG: 683 | End: 2018-08-29
Attending: EMERGENCY MEDICINE | Admitting: INTERNAL MEDICINE
Payer: MEDICARE

## 2018-08-26 ENCOUNTER — APPOINTMENT (OUTPATIENT)
Dept: RADIOLOGY | Facility: MEDICAL CENTER | Age: 68
DRG: 683 | End: 2018-08-26
Attending: EMERGENCY MEDICINE
Payer: MEDICARE

## 2018-08-26 DIAGNOSIS — E87.5 HYPERKALEMIA: ICD-10-CM

## 2018-08-26 DIAGNOSIS — Z91.199 NONCOMPLIANCE: ICD-10-CM

## 2018-08-26 DIAGNOSIS — N17.9 ACUTE RENAL FAILURE, UNSPECIFIED ACUTE RENAL FAILURE TYPE (HCC): ICD-10-CM

## 2018-08-26 PROBLEM — E87.1 HYPONATREMIA: Status: ACTIVE | Noted: 2018-08-26

## 2018-08-26 PROBLEM — E87.29 HIGH ANION GAP METABOLIC ACIDOSIS: Status: ACTIVE | Noted: 2018-08-26

## 2018-08-26 LAB
ALBUMIN SERPL BCP-MCNC: 3.5 G/DL (ref 3.2–4.9)
ALBUMIN/GLOB SERPL: 1.3 G/DL
ALP SERPL-CCNC: 93 U/L (ref 30–99)
ALT SERPL-CCNC: 60 U/L (ref 2–50)
ANION GAP SERPL CALC-SCNC: 16 MMOL/L (ref 0–11.9)
APTT PPP: 34.3 SEC (ref 24.7–36)
AST SERPL-CCNC: 40 U/L (ref 12–45)
BASOPHILS # BLD AUTO: 0.6 % (ref 0–1.8)
BASOPHILS # BLD: 0.04 K/UL (ref 0–0.12)
BILIRUB SERPL-MCNC: 0.7 MG/DL (ref 0.1–1.5)
BNP SERPL-MCNC: 522 PG/ML (ref 0–100)
BUN SERPL-MCNC: 107 MG/DL (ref 8–22)
CALCIUM SERPL-MCNC: 8.5 MG/DL (ref 8.4–10.2)
CHLORIDE SERPL-SCNC: 85 MMOL/L (ref 96–112)
CK SERPL-CCNC: 100 U/L (ref 0–154)
CO2 SERPL-SCNC: 19 MMOL/L (ref 20–33)
COMMENT 1642: NORMAL
CREAT SERPL-MCNC: 12.07 MG/DL (ref 0.5–1.4)
EOSINOPHIL # BLD AUTO: 0.12 K/UL (ref 0–0.51)
EOSINOPHIL NFR BLD: 1.8 % (ref 0–6.9)
ERYTHROCYTE [DISTWIDTH] IN BLOOD BY AUTOMATED COUNT: 36.8 FL (ref 35.9–50)
GLOBULIN SER CALC-MCNC: 2.7 G/DL (ref 1.9–3.5)
GLUCOSE SERPL-MCNC: 117 MG/DL (ref 65–99)
HCT VFR BLD AUTO: 34.7 % (ref 42–52)
HGB BLD-MCNC: 12.4 G/DL (ref 14–18)
IMM GRANULOCYTES # BLD AUTO: 0.11 K/UL (ref 0–0.11)
IMM GRANULOCYTES NFR BLD AUTO: 1.6 % (ref 0–0.9)
INR PPP: 1.05 (ref 0.87–1.13)
LYMPHOCYTES # BLD AUTO: 1.33 K/UL (ref 1–4.8)
LYMPHOCYTES NFR BLD: 19.4 % (ref 22–41)
MCH RBC QN AUTO: 29.2 PG (ref 27–33)
MCHC RBC AUTO-ENTMCNC: 35.7 G/DL (ref 33.7–35.3)
MCV RBC AUTO: 81.8 FL (ref 81.4–97.8)
MONOCYTES # BLD AUTO: 0.37 K/UL (ref 0–0.85)
MONOCYTES NFR BLD AUTO: 5.4 % (ref 0–13.4)
NEUTROPHILS # BLD AUTO: 4.87 K/UL (ref 1.82–7.42)
NEUTROPHILS NFR BLD: 71.2 % (ref 44–72)
NRBC # BLD AUTO: 0 K/UL
NRBC BLD-RTO: 0 /100 WBC
PLATELET # BLD AUTO: 156 K/UL (ref 164–446)
PMV BLD AUTO: 9.4 FL (ref 9–12.9)
POTASSIUM SERPL-SCNC: 6.6 MMOL/L (ref 3.6–5.5)
PROT SERPL-MCNC: 6.2 G/DL (ref 6–8.2)
PROTHROMBIN TIME: 13.6 SEC (ref 12–14.6)
RBC # BLD AUTO: 4.24 M/UL (ref 4.7–6.1)
SODIUM SERPL-SCNC: 120 MMOL/L (ref 135–145)
TROPONIN I SERPL-MCNC: <0.02 NG/ML (ref 0–0.04)
WBC # BLD AUTO: 6.8 K/UL (ref 4.8–10.8)

## 2018-08-26 PROCEDURE — 85730 THROMBOPLASTIN TIME PARTIAL: CPT

## 2018-08-26 PROCEDURE — 96375 TX/PRO/DX INJ NEW DRUG ADDON: CPT

## 2018-08-26 PROCEDURE — 82570 ASSAY OF URINE CREATININE: CPT

## 2018-08-26 PROCEDURE — 70450 CT HEAD/BRAIN W/O DYE: CPT

## 2018-08-26 PROCEDURE — 700102 HCHG RX REV CODE 250 W/ 637 OVERRIDE(OP): Performed by: INTERNAL MEDICINE

## 2018-08-26 PROCEDURE — 85025 COMPLETE CBC W/AUTO DIFF WBC: CPT

## 2018-08-26 PROCEDURE — 83880 ASSAY OF NATRIURETIC PEPTIDE: CPT

## 2018-08-26 PROCEDURE — 700111 HCHG RX REV CODE 636 W/ 250 OVERRIDE (IP): Performed by: EMERGENCY MEDICINE

## 2018-08-26 PROCEDURE — 80053 COMPREHEN METABOLIC PANEL: CPT

## 2018-08-26 PROCEDURE — 700102 HCHG RX REV CODE 250 W/ 637 OVERRIDE(OP): Performed by: EMERGENCY MEDICINE

## 2018-08-26 PROCEDURE — 99223 1ST HOSP IP/OBS HIGH 75: CPT | Mod: AI | Performed by: INTERNAL MEDICINE

## 2018-08-26 PROCEDURE — 84484 ASSAY OF TROPONIN QUANT: CPT

## 2018-08-26 PROCEDURE — 700105 HCHG RX REV CODE 258: Performed by: EMERGENCY MEDICINE

## 2018-08-26 PROCEDURE — 700101 HCHG RX REV CODE 250: Performed by: EMERGENCY MEDICINE

## 2018-08-26 PROCEDURE — 84300 ASSAY OF URINE SODIUM: CPT

## 2018-08-26 PROCEDURE — 700111 HCHG RX REV CODE 636 W/ 250 OVERRIDE (IP): Performed by: INTERNAL MEDICINE

## 2018-08-26 PROCEDURE — 96365 THER/PROPH/DIAG IV INF INIT: CPT

## 2018-08-26 PROCEDURE — 36415 COLL VENOUS BLD VENIPUNCTURE: CPT

## 2018-08-26 PROCEDURE — 700102 HCHG RX REV CODE 250 W/ 637 OVERRIDE(OP)

## 2018-08-26 PROCEDURE — 85610 PROTHROMBIN TIME: CPT

## 2018-08-26 PROCEDURE — 770020 HCHG ROOM/CARE - TELE (206)

## 2018-08-26 PROCEDURE — 700111 HCHG RX REV CODE 636 W/ 250 OVERRIDE (IP)

## 2018-08-26 PROCEDURE — 93005 ELECTROCARDIOGRAM TRACING: CPT | Performed by: EMERGENCY MEDICINE

## 2018-08-26 PROCEDURE — 99285 EMERGENCY DEPT VISIT HI MDM: CPT

## 2018-08-26 PROCEDURE — 82550 ASSAY OF CK (CPK): CPT

## 2018-08-26 PROCEDURE — A9270 NON-COVERED ITEM OR SERVICE: HCPCS | Performed by: INTERNAL MEDICINE

## 2018-08-26 PROCEDURE — 700105 HCHG RX REV CODE 258: Performed by: INTERNAL MEDICINE

## 2018-08-26 RX ORDER — BUPROPION HYDROCHLORIDE 150 MG/1
150 TABLET, EXTENDED RELEASE ORAL EVERY MORNING
Status: DISCONTINUED | OUTPATIENT
Start: 2018-08-27 | End: 2018-08-29 | Stop reason: HOSPADM

## 2018-08-26 RX ORDER — ONDANSETRON 4 MG/1
4 TABLET, ORALLY DISINTEGRATING ORAL EVERY 4 HOURS PRN
Status: DISCONTINUED | OUTPATIENT
Start: 2018-08-26 | End: 2018-08-29 | Stop reason: HOSPADM

## 2018-08-26 RX ORDER — HEPARIN SODIUM 5000 [USP'U]/ML
5000 INJECTION, SOLUTION INTRAVENOUS; SUBCUTANEOUS EVERY 8 HOURS
Status: DISCONTINUED | OUTPATIENT
Start: 2018-08-26 | End: 2018-08-29 | Stop reason: HOSPADM

## 2018-08-26 RX ORDER — CYCLOBENZAPRINE HCL 10 MG
5 TABLET ORAL 3 TIMES DAILY PRN
Status: DISCONTINUED | OUTPATIENT
Start: 2018-08-26 | End: 2018-08-29 | Stop reason: HOSPADM

## 2018-08-26 RX ORDER — FINASTERIDE 5 MG/1
5 TABLET, FILM COATED ORAL DAILY
Status: DISCONTINUED | OUTPATIENT
Start: 2018-08-27 | End: 2018-08-29 | Stop reason: HOSPADM

## 2018-08-26 RX ORDER — LABETALOL HYDROCHLORIDE 5 MG/ML
10 INJECTION, SOLUTION INTRAVENOUS EVERY 4 HOURS PRN
Status: DISCONTINUED | OUTPATIENT
Start: 2018-08-26 | End: 2018-08-29 | Stop reason: HOSPADM

## 2018-08-26 RX ORDER — ACETAMINOPHEN 325 MG/1
650 TABLET ORAL EVERY 8 HOURS
Status: DISCONTINUED | OUTPATIENT
Start: 2018-08-26 | End: 2018-08-26

## 2018-08-26 RX ORDER — PROPRANOLOL HYDROCHLORIDE 20 MG/1
20 TABLET ORAL DAILY
Status: DISCONTINUED | OUTPATIENT
Start: 2018-08-27 | End: 2018-08-29 | Stop reason: HOSPADM

## 2018-08-26 RX ORDER — CALCIUM GLUCONATE 94 MG/ML
1 INJECTION, SOLUTION INTRAVENOUS ONCE
Status: COMPLETED | OUTPATIENT
Start: 2018-08-26 | End: 2018-08-26

## 2018-08-26 RX ORDER — POLYETHYLENE GLYCOL 3350 17 G/17G
1 POWDER, FOR SOLUTION ORAL
Status: DISCONTINUED | OUTPATIENT
Start: 2018-08-26 | End: 2018-08-29 | Stop reason: HOSPADM

## 2018-08-26 RX ORDER — DEXTROSE MONOHYDRATE 25 G/50ML
50 INJECTION, SOLUTION INTRAVENOUS ONCE
Status: COMPLETED | OUTPATIENT
Start: 2018-08-26 | End: 2018-08-26

## 2018-08-26 RX ORDER — ESCITALOPRAM OXALATE 10 MG/1
20 TABLET ORAL DAILY
Status: DISCONTINUED | OUTPATIENT
Start: 2018-08-27 | End: 2018-08-29 | Stop reason: HOSPADM

## 2018-08-26 RX ORDER — SODIUM POLYSTYRENE SULFONATE 15 G/60ML
15 SUSPENSION ORAL; RECTAL ONCE
Status: COMPLETED | OUTPATIENT
Start: 2018-08-26 | End: 2018-08-26

## 2018-08-26 RX ORDER — TAMSULOSIN HYDROCHLORIDE 0.4 MG/1
0.4 CAPSULE ORAL
Status: DISCONTINUED | OUTPATIENT
Start: 2018-08-27 | End: 2018-08-29 | Stop reason: HOSPADM

## 2018-08-26 RX ORDER — BUSPIRONE HYDROCHLORIDE 5 MG/1
10 TABLET ORAL 3 TIMES DAILY
Status: DISCONTINUED | OUTPATIENT
Start: 2018-08-26 | End: 2018-08-26

## 2018-08-26 RX ORDER — AMOXICILLIN 250 MG
2 CAPSULE ORAL 2 TIMES DAILY
Status: DISCONTINUED | OUTPATIENT
Start: 2018-08-26 | End: 2018-08-29 | Stop reason: HOSPADM

## 2018-08-26 RX ORDER — SODIUM CHLORIDE 9 MG/ML
1000 INJECTION, SOLUTION INTRAVENOUS ONCE
Status: COMPLETED | OUTPATIENT
Start: 2018-08-26 | End: 2018-08-26

## 2018-08-26 RX ORDER — BISACODYL 10 MG
10 SUPPOSITORY, RECTAL RECTAL
Status: DISCONTINUED | OUTPATIENT
Start: 2018-08-26 | End: 2018-08-29 | Stop reason: HOSPADM

## 2018-08-26 RX ORDER — LORAZEPAM 2 MG/ML
1 INJECTION INTRAMUSCULAR ONCE
Status: DISCONTINUED | OUTPATIENT
Start: 2018-08-26 | End: 2018-08-26

## 2018-08-26 RX ORDER — ACETAMINOPHEN 325 MG/1
650 TABLET ORAL EVERY 8 HOURS PRN
Status: DISCONTINUED | OUTPATIENT
Start: 2018-08-26 | End: 2018-08-29 | Stop reason: HOSPADM

## 2018-08-26 RX ORDER — DIPHENHYDRAMINE HCL 25 MG
25 TABLET ORAL NIGHTLY PRN
Status: DISCONTINUED | OUTPATIENT
Start: 2018-08-26 | End: 2018-08-29 | Stop reason: HOSPADM

## 2018-08-26 RX ORDER — AMLODIPINE BESYLATE 5 MG/1
10 TABLET ORAL
Status: DISCONTINUED | OUTPATIENT
Start: 2018-08-27 | End: 2018-08-29 | Stop reason: HOSPADM

## 2018-08-26 RX ORDER — ONDANSETRON 2 MG/ML
4 INJECTION INTRAMUSCULAR; INTRAVENOUS EVERY 4 HOURS PRN
Status: DISCONTINUED | OUTPATIENT
Start: 2018-08-26 | End: 2018-08-29 | Stop reason: HOSPADM

## 2018-08-26 RX ADMIN — DEXTROSE MONOHYDRATE 50 ML: 25 INJECTION, SOLUTION INTRAVENOUS at 20:14

## 2018-08-26 RX ADMIN — CALCIUM GLUCONATE 1 G: 98 INJECTION, SOLUTION INTRAVENOUS at 20:21

## 2018-08-26 RX ADMIN — SODIUM POLYSTYRENE SULFONATE 15 G: 15 SUSPENSION ORAL; RECTAL at 22:45

## 2018-08-26 RX ADMIN — SODIUM CHLORIDE 1000 ML: 900 INJECTION, SOLUTION INTRAVENOUS at 18:45

## 2018-08-26 RX ADMIN — HEPARIN SODIUM 5000 UNITS: 5000 INJECTION, SOLUTION INTRAVENOUS; SUBCUTANEOUS at 22:46

## 2018-08-26 RX ADMIN — SODIUM BICARBONATE: 84 INJECTION, SOLUTION INTRAVENOUS at 22:15

## 2018-08-26 RX ADMIN — SODIUM BICARBONATE 75 MEQ: 84 INJECTION, SOLUTION INTRAVENOUS at 23:49

## 2018-08-26 RX ADMIN — CALCIUM GLUCONATE 1 G: 94 INJECTION, SOLUTION INTRAVENOUS at 21:35

## 2018-08-26 RX ADMIN — INSULIN HUMAN 8 UNITS: 100 INJECTION, SOLUTION PARENTERAL at 20:14

## 2018-08-26 ASSESSMENT — ENCOUNTER SYMPTOMS
VOMITING: 0
ABDOMINAL PAIN: 0
MYALGIAS: 0
DIARRHEA: 0
HEADACHES: 1
TINGLING: 0
COUGH: 0
LOSS OF CONSCIOUSNESS: 0
SPUTUM PRODUCTION: 0
PALPITATIONS: 0
SHORTNESS OF BREATH: 0
CONSTIPATION: 0
CHILLS: 0
DIZZINESS: 0
WEAKNESS: 0
FALLS: 1
NAUSEA: 0
DEPRESSION: 0
STRIDOR: 0
FEVER: 0

## 2018-08-26 ASSESSMENT — COGNITIVE AND FUNCTIONAL STATUS - GENERAL
SUGGESTED CMS G CODE MODIFIER MOBILITY: CH
DAILY ACTIVITIY SCORE: 24
MOBILITY SCORE: 24
SUGGESTED CMS G CODE MODIFIER DAILY ACTIVITY: CH

## 2018-08-26 ASSESSMENT — LIFESTYLE VARIABLES: ALCOHOL_USE: NO

## 2018-08-27 PROBLEM — N13.9 OBSTRUCTIVE UROPATHY: Status: ACTIVE | Noted: 2018-08-27

## 2018-08-27 PROBLEM — D64.9 ANEMIA: Status: ACTIVE | Noted: 2018-08-27

## 2018-08-27 PROBLEM — I10 HYPERTENSION: Status: ACTIVE | Noted: 2018-08-27

## 2018-08-27 LAB
ANION GAP SERPL CALC-SCNC: 10 MMOL/L (ref 0–11.9)
ANION GAP SERPL CALC-SCNC: 11 MMOL/L (ref 0–11.9)
ANION GAP SERPL CALC-SCNC: 13 MMOL/L (ref 0–11.9)
ANION GAP SERPL CALC-SCNC: 14 MMOL/L (ref 0–11.9)
BUN SERPL-MCNC: 105 MG/DL (ref 8–22)
BUN SERPL-MCNC: 107 MG/DL (ref 8–22)
BUN SERPL-MCNC: 61 MG/DL (ref 8–22)
BUN SERPL-MCNC: 86 MG/DL (ref 8–22)
CALCIUM SERPL-MCNC: 8.3 MG/DL (ref 8.4–10.2)
CALCIUM SERPL-MCNC: 8.5 MG/DL (ref 8.4–10.2)
CALCIUM SERPL-MCNC: 8.6 MG/DL (ref 8.4–10.2)
CALCIUM SERPL-MCNC: 8.9 MG/DL (ref 8.4–10.2)
CHLORIDE SERPL-SCNC: 101 MMOL/L (ref 96–112)
CHLORIDE SERPL-SCNC: 87 MMOL/L (ref 96–112)
CHLORIDE SERPL-SCNC: 87 MMOL/L (ref 96–112)
CHLORIDE SERPL-SCNC: 94 MMOL/L (ref 96–112)
CO2 SERPL-SCNC: 18 MMOL/L (ref 20–33)
CO2 SERPL-SCNC: 20 MMOL/L (ref 20–33)
CO2 SERPL-SCNC: 20 MMOL/L (ref 20–33)
CO2 SERPL-SCNC: 21 MMOL/L (ref 20–33)
CREAT SERPL-MCNC: 12.13 MG/DL (ref 0.5–1.4)
CREAT SERPL-MCNC: 12.16 MG/DL (ref 0.5–1.4)
CREAT SERPL-MCNC: 5.5 MG/DL (ref 0.5–1.4)
CREAT SERPL-MCNC: 8.26 MG/DL (ref 0.5–1.4)
CREAT UR-MCNC: 59.1 MG/DL
EKG IMPRESSION: NORMAL
ERYTHROCYTE [DISTWIDTH] IN BLOOD BY AUTOMATED COUNT: 37.7 FL (ref 35.9–50)
GLUCOSE SERPL-MCNC: 127 MG/DL (ref 65–99)
GLUCOSE SERPL-MCNC: 201 MG/DL (ref 65–99)
GLUCOSE SERPL-MCNC: 89 MG/DL (ref 65–99)
GLUCOSE SERPL-MCNC: 90 MG/DL (ref 65–99)
HCT VFR BLD AUTO: 30 % (ref 42–52)
HGB BLD-MCNC: 10.7 G/DL (ref 14–18)
LACTATE BLD-SCNC: 0.9 MMOL/L (ref 0.5–2)
MCH RBC QN AUTO: 29.5 PG (ref 27–33)
MCHC RBC AUTO-ENTMCNC: 35.7 G/DL (ref 33.7–35.3)
MCV RBC AUTO: 82.6 FL (ref 81.4–97.8)
PLATELET # BLD AUTO: 148 K/UL (ref 164–446)
PMV BLD AUTO: 9.3 FL (ref 9–12.9)
POTASSIUM SERPL-SCNC: 4.7 MMOL/L (ref 3.6–5.5)
POTASSIUM SERPL-SCNC: 5.1 MMOL/L (ref 3.6–5.5)
POTASSIUM SERPL-SCNC: 6.2 MMOL/L (ref 3.6–5.5)
POTASSIUM SERPL-SCNC: 6.4 MMOL/L (ref 3.6–5.5)
RBC # BLD AUTO: 3.63 M/UL (ref 4.7–6.1)
SODIUM SERPL-SCNC: 118 MMOL/L (ref 135–145)
SODIUM SERPL-SCNC: 119 MMOL/L (ref 135–145)
SODIUM SERPL-SCNC: 127 MMOL/L (ref 135–145)
SODIUM SERPL-SCNC: 132 MMOL/L (ref 135–145)
SODIUM UR-SCNC: 35 MMOL/L
WBC # BLD AUTO: 6.3 K/UL (ref 4.8–10.8)

## 2018-08-27 PROCEDURE — 80048 BASIC METABOLIC PNL TOTAL CA: CPT | Mod: 91

## 2018-08-27 PROCEDURE — 700111 HCHG RX REV CODE 636 W/ 250 OVERRIDE (IP): Performed by: INTERNAL MEDICINE

## 2018-08-27 PROCEDURE — A9270 NON-COVERED ITEM OR SERVICE: HCPCS | Performed by: HOSPITALIST

## 2018-08-27 PROCEDURE — 700105 HCHG RX REV CODE 258: Performed by: INTERNAL MEDICINE

## 2018-08-27 PROCEDURE — 99233 SBSQ HOSP IP/OBS HIGH 50: CPT | Performed by: HOSPITALIST

## 2018-08-27 PROCEDURE — 770020 HCHG ROOM/CARE - TELE (206)

## 2018-08-27 PROCEDURE — A9270 NON-COVERED ITEM OR SERVICE: HCPCS | Performed by: INTERNAL MEDICINE

## 2018-08-27 PROCEDURE — 85027 COMPLETE CBC AUTOMATED: CPT

## 2018-08-27 PROCEDURE — 700105 HCHG RX REV CODE 258: Performed by: HOSPITALIST

## 2018-08-27 PROCEDURE — 83605 ASSAY OF LACTIC ACID: CPT

## 2018-08-27 PROCEDURE — 700102 HCHG RX REV CODE 250 W/ 637 OVERRIDE(OP): Performed by: INTERNAL MEDICINE

## 2018-08-27 PROCEDURE — 700102 HCHG RX REV CODE 250 W/ 637 OVERRIDE(OP): Performed by: HOSPITALIST

## 2018-08-27 RX ORDER — SODIUM CHLORIDE 9 MG/ML
INJECTION, SOLUTION INTRAVENOUS CONTINUOUS
Status: DISCONTINUED | OUTPATIENT
Start: 2018-08-27 | End: 2018-08-29 | Stop reason: HOSPADM

## 2018-08-27 RX ORDER — ALPRAZOLAM 0.5 MG/1
0.5 TABLET ORAL
Status: DISPENSED | OUTPATIENT
Start: 2018-08-27 | End: 2018-08-28

## 2018-08-27 RX ADMIN — ALPRAZOLAM 0.5 MG: 0.5 TABLET ORAL at 08:31

## 2018-08-27 RX ADMIN — AMLODIPINE BESYLATE 10 MG: 5 TABLET ORAL at 06:02

## 2018-08-27 RX ADMIN — SODIUM CHLORIDE: 9 INJECTION, SOLUTION INTRAVENOUS at 16:40

## 2018-08-27 RX ADMIN — TAMSULOSIN HYDROCHLORIDE 0.4 MG: 0.4 CAPSULE ORAL at 08:31

## 2018-08-27 RX ADMIN — FINASTERIDE 5 MG: 5 TABLET, FILM COATED ORAL at 06:03

## 2018-08-27 RX ADMIN — HEPARIN SODIUM 5000 UNITS: 5000 INJECTION, SOLUTION INTRAVENOUS; SUBCUTANEOUS at 21:23

## 2018-08-27 RX ADMIN — SODIUM BICARBONATE: 84 INJECTION, SOLUTION INTRAVENOUS at 10:32

## 2018-08-27 RX ADMIN — HEPARIN SODIUM 5000 UNITS: 5000 INJECTION, SOLUTION INTRAVENOUS; SUBCUTANEOUS at 15:27

## 2018-08-27 RX ADMIN — DIPHENHYDRAMINE HCL 25 MG: 25 TABLET ORAL at 00:30

## 2018-08-27 RX ADMIN — PROPRANOLOL HYDROCHLORIDE 20 MG: 20 TABLET ORAL at 06:04

## 2018-08-27 RX ADMIN — STANDARDIZED SENNA CONCENTRATE AND DOCUSATE SODIUM 2 TABLET: 8.6; 5 TABLET, FILM COATED ORAL at 06:04

## 2018-08-27 RX ADMIN — STANDARDIZED SENNA CONCENTRATE AND DOCUSATE SODIUM 2 TABLET: 8.6; 5 TABLET, FILM COATED ORAL at 00:30

## 2018-08-27 RX ADMIN — BUPROPION HYDROCHLORIDE 150 MG: 150 TABLET, FILM COATED, EXTENDED RELEASE ORAL at 06:03

## 2018-08-27 RX ADMIN — ESCITALOPRAM OXALATE 20 MG: 10 TABLET ORAL at 06:03

## 2018-08-27 RX ADMIN — HEPARIN SODIUM 5000 UNITS: 5000 INJECTION, SOLUTION INTRAVENOUS; SUBCUTANEOUS at 06:04

## 2018-08-27 ASSESSMENT — ENCOUNTER SYMPTOMS
HEADACHES: 0
BLURRED VISION: 0
DEPRESSION: 0
EYE PAIN: 0
COUGH: 0
BACK PAIN: 0
CHILLS: 0
PALPITATIONS: 0
VOMITING: 0
INSOMNIA: 0
ABDOMINAL PAIN: 1
DIZZINESS: 0
SHORTNESS OF BREATH: 0
SORE THROAT: 0
NECK PAIN: 0
TINGLING: 0
FEVER: 0
NAUSEA: 0

## 2018-08-27 ASSESSMENT — PAIN SCALES - GENERAL
PAINLEVEL_OUTOF10: 0

## 2018-08-27 ASSESSMENT — PATIENT HEALTH QUESTIONNAIRE - PHQ9
1. LITTLE INTEREST OR PLEASURE IN DOING THINGS: NOT AT ALL
2. FEELING DOWN, DEPRESSED, IRRITABLE, OR HOPELESS: NOT AT ALL
1. LITTLE INTEREST OR PLEASURE IN DOING THINGS: NOT AT ALL
SUM OF ALL RESPONSES TO PHQ9 QUESTIONS 1 AND 2: 0
SUM OF ALL RESPONSES TO PHQ9 QUESTIONS 1 AND 2: 0
2. FEELING DOWN, DEPRESSED, IRRITABLE, OR HOPELESS: NOT AT ALL

## 2018-08-27 ASSESSMENT — LIFESTYLE VARIABLES: EVER_SMOKED: NEVER

## 2018-08-27 NOTE — H&P
Hospital Medicine History & Physical Note    Date of Service  8/26/2018    Primary Care Physician  REID Vargas.    Consultants  Nephrology    Code Status  Full    Chief Complaint  Fall    History of Presenting Illness  67 y.o. male who presented 8/26/2018 with fall.  Patient states he was walking down the stairs, the cat gotten his way causing him to trip, he fell and hit the back of his head.  Patient states he fell down multiple stairs.  He denies any loss of consciousness.  Mild headache.  Patient denies any double vision or confusion.  Upon arrival patient was again noted to have a markedly elevated creatinine and potassium.  He had this 2 days ago, was told he needed to stay in the hospital yet he refused.  Patient was seen by hospitalist and nephrology at that point in time.  It was deemed he likely has obstruction.  Patient at this point in time is adamant about his refusal of a Buchanan.    Review of Systems  Review of Systems   Constitutional: Negative for chills, fever and malaise/fatigue.   HENT: Negative for congestion.    Respiratory: Negative for cough, sputum production, shortness of breath and stridor.    Cardiovascular: Negative for chest pain, palpitations and leg swelling.   Gastrointestinal: Negative for abdominal pain, constipation, diarrhea, nausea and vomiting.   Genitourinary: Negative for dysuria and urgency.   Musculoskeletal: Positive for falls. Negative for myalgias.   Neurological: Positive for headaches. Negative for dizziness, tingling, loss of consciousness and weakness.   Psychiatric/Behavioral: Negative for depression and suicidal ideas.   All other systems reviewed and are negative.      Past Medical History   has a past medical history of Anxiety; Depression; Hypertension; and Low back pain.    Surgical History  None      Family History  family history includes Cancer in his maternal grandmother; Cancer (age of onset: 65) in his mother; Hypertension in his mother.      Social History   reports that he has never smoked. He has never used smokeless tobacco. He reports that he does not drink alcohol or use drugs.    Allergies  Allergies   Allergen Reactions   • Bactrim [Sulfamethoxazole W-Trimethoprim]        Medications  Prior to Admission Medications   Prescriptions Last Dose Informant Patient Reported? Taking?   Phenazopyridine HCl (AZO TABS PO) 8/19/2018 Patient Yes No   Sig: Take  by mouth.   amlodipine-benazepril (LOTREL) 5-20 MG per capsule 8/24/2018 at 0530 Patient No No   Sig: Take 1 Cap by mouth every day.   buPROPion (WELLBUTRIN XL) 150 MG XL tablet 8/24/2018 at 0530 Patient Yes No   Sig: Take 150 mg by mouth every morning.   busPIRone (BUSPAR) 10 MG Tab 8/24/2018 at 0530 Patient Yes No   Sig: Take 10 mg by mouth 3 times a day.   cyclobenzaprine (FLEXERIL) 5 MG tablet 8/23/2018 at mid morning Patient No No   Sig: Take 1 Tab by mouth 3 times a day as needed.   diphenhydrAMINE (BENADRYL) 25 MG capsule 8/24/2018 at am Patient Yes No   Sig: Take 1 Cap by mouth 1 time daily as needed (allergy symptoms).   escitalopram (LEXAPRO) 20 MG tablet 8/24/2018 at 0530 Patient No No   Sig: Take 1 Tab by mouth every day.   propranolol (INDERAL) 20 MG Tab 8/24/2018 at 0530 Patient Yes No   Sig: Take 20 mg by mouth every day.      Facility-Administered Medications: None       Physical Exam  Blood Pressure : 134/65   Temperature: 37 °C (98.6 °F)   Pulse: 96   Respiration: (!) 23   Pulse Oximetry: 93 %     Physical Exam   Constitutional: He is oriented to person, place, and time. He appears well-developed.  Non-toxic appearance. No distress.   HENT:   Head: Normocephalic.   Mouth/Throat: Oropharynx is clear and moist. No oropharyngeal exudate.   Posterior head abrasions    Eyes: Right eye exhibits no discharge. Left eye exhibits no discharge.   Neck: Neck supple. No tracheal deviation, no edema and no erythema present.   Cardiovascular: Normal rate and regular rhythm.  Exam reveals no  gallop and no friction rub.    No murmur heard.  Pulmonary/Chest: Effort normal and breath sounds normal. No stridor. No respiratory distress. He has no wheezes. He has no rales. He exhibits no tenderness.   Abdominal: Soft. Bowel sounds are normal. He exhibits distension (large bladder). There is no tenderness.   Musculoskeletal: Normal range of motion. He exhibits no edema or tenderness.   Lymphadenopathy:     He has no cervical adenopathy.   Neurological: He is alert and oriented to person, place, and time. No cranial nerve deficit.   Skin: Skin is warm and dry. No rash noted. He is not diaphoretic. No erythema.   Psychiatric: He has a normal mood and affect. His behavior is normal. Judgment and thought content normal.   Nursing note and vitals reviewed.      Laboratory:  Recent Labs      08/24/18   1038  08/26/18   1825   WBC  7.4  6.8   RBC  4.33*  4.24*   HEMOGLOBIN  12.3*  12.4*   HEMATOCRIT  36.4*  34.7*   MCV  84.1  81.8   MCH  28.4  29.2   MCHC  33.8  35.7*   RDW  38.5  36.8   PLATELETCT  125*  156*   MPV  9.2  9.4     Recent Labs      08/24/18   1038  08/26/18   1825   SODIUM  120*  120*   POTASSIUM  5.9*  6.6*   CHLORIDE  89*  85*   CO2  18*  19*   GLUCOSE  106*  117*   BUN  82*  107*   CREATININE  9.52*  12.07*   CALCIUM  8.4*  8.5     Recent Labs      08/24/18   1038  08/26/18   1825   ALTSGPT  39  60*   ASTSGOT  33  40   ALKPHOSPHAT  83  93   TBILIRUBIN  0.4  0.7   GLUCOSE  106*  117*     Recent Labs      08/26/18   1825   APTT  34.3   INR  1.05     Recent Labs      08/26/18   1825   BNPBTYPENAT  522*         Lab Results   Component Value Date    TROPONINI <0.02 08/26/2018       Urinalysis:    No results found     Imaging:  CT-HEAD W/O   Final Result         1. No acute intracranial abnormality. No evidence of acute intracranial hemorrhage or mass lesion.                     Assessment/Plan:  I anticipate this patient will require at least two midnights for appropriate medical management,  necessitating inpatient admission.    Hyperkalemia   Assessment & Plan    -Significant elevation however no peaked T waves or changes on telemetry  -Patient has received glucose as well as IV insulin and calcium in the emergency department  -Give Kayexalate because I am fearful that his kidney function is not going to improve since he is refusing a Marie  -Repeat BMPs every 4 hours and continue to treat as warranted  -Start bicarb drip        Acute renal failure (ARF) ([facility])   Assessment & Plan    -Initially note on labs 5 days ago, patient has refused treatment  -Continues to worsen  -Very large bladder, this is all likely due to obstruction  -Patient is adamant about his refusal of a Marie, myself and Dr Oseguera have discussed this with him, I obtained a bladder scan, it had > 1L, I'm hopeful all this info will convince him but he wanted to leave and only agreed to stay if there was no marie placement, pt is alert and oriented, understands the risk he is taking in refusing, much time was spent by myself and ERP to convince him to accept a marie but he continued to refuse  -presumed BPH considering the significant amount of fluid in the bladder, will go ahead and start Flomax and Proscar  -Starting bicarb drip  -Await additional recommendations from nephrology          High anion gap metabolic acidosis   Assessment & Plan    -Certainly would expect significant acidosis due to how significant his renal failure is  -However there is a gap component to it, will obtain a lactic acid and a CPK to make sure there is nothing additional going on however even if these are elevated I would still think that this was due to his kidneys        Hyponatremia   Assessment & Plan    -Stable, was 120 two days ago at last emergency room visit as well  -Consider putting bicarbonate and D5W, place it in half normal saline        Recurrent major depressive disorder, in full remission (HCC)- (present on admission)   Assessment & Plan     -Continue home meds        Essential hypertension- (present on admission)   Assessment & Plan    -Continue home propranolol and amlodipine, increase the amlodipine dose since benazepril needs to be stopped  -Place PRN labetalol and adjust as needed            VTE prophylaxis: Heparin

## 2018-08-27 NOTE — PROGRESS NOTES
Pt awake and alert during morning rounds  Skin warm and dry  Pt refusing str cath  Offered to have a male nurse str cath  Pt agrees  Spoke with nurse Juan   Will schedule time for str cath

## 2018-08-27 NOTE — ED NOTES
"Pt refusing marie. Pt given extensive education on reasons he needs to be admitted and the risks of leaving. Pt continues to verbalize understanding, stating that \"I am not staying, I am scared of hospitals and I would rather die than stay and have a catheter.\" Pt offered sedation for marie but continues to refuse. Pt agreeable to getting IV medication at this time and speaking with the hospitalist. Pt denies SI. Pt states he has no hx psychiatric illness, he is\" just scared and would rather die\" ERP aware.   "

## 2018-08-27 NOTE — PROGRESS NOTES
Med Rec completed per patient  Allergies reviewed    Patient stated he was taking Bactrim for 6 days and broke out into a rash, stopped the medications and was not changed to another medication

## 2018-08-27 NOTE — ED NOTES
Pt transferred to Tele via gurney on cardiac monitor with all belongings and family at side. Report to be given at bedside.

## 2018-08-27 NOTE — CARE PLAN
Problem: Knowledge Deficit  Goal: Knowledge of disease process/condition, treatment plan, diagnostic tests, and medications will improve  Outcome: PROGRESSING AS EXPECTED    Intervention: Explain information regarding disease process/condition, treatment plan, diagnostic tests, and medications and document in education  Discussed need for marie cath accurate I & O premedicate prior  Male nurse  Pt verbalizes understanding and agrees

## 2018-08-27 NOTE — ED PROVIDER NOTES
ED Provider Note    CHIEF COMPLAINT  Chief Complaint   Patient presents with   • T-5000 GLF   • Head Laceration       HPI  Gunnar Mcneil is a 67 y.o. male who presents to the emergency department after tripping over his cat and falling down stairs and hitting his head.  The patient sustained some superficial abrasions and lacerations to the occipital part of the skull there was no loss of consciousness.  The patient's  also says that the patient recently signed out AGAINST MEDICAL ADVICE from the hospital after being diagnosed with renal failure and has been refusing to undergo treatment.  The patient confirms this.  The patient was apparently found to have acute renal failure at an urgent care and then had an ER visit where his renal failure was noted to be much worse and he was advised to undergo hospitalization and treatment which he refused and he has been refusing any treatment since then until he fell today.    REVIEW OF SYSTEMS no neck pain no chest pain no difficulty breathing no nausea or vomiting.  The patient says that he only passes very small amounts of urine at a time.  All other systems negative    PAST MEDICAL HISTORY  Past Medical History:   Diagnosis Date   • Anxiety    • Depression    • Hypertension    • Low back pain        FAMILY HISTORY  Family History   Problem Relation Age of Onset   • Cancer Mother 65        breast   • Hypertension Mother    • Cancer Maternal Grandmother         lung       SOCIAL HISTORY  Social History     Social History   • Marital status: Single     Spouse name: N/A   • Number of children: N/A   • Years of education: N/A     Social History Main Topics   • Smoking status: Never Smoker   • Smokeless tobacco: Never Used   • Alcohol use No   • Drug use: No   • Sexual activity: Not on file     Other Topics Concern   • Not on file     Social History Narrative   • No narrative on file       SURGICAL HISTORY  History reviewed. No pertinent surgical history.    CURRENT  "MEDICATIONS  Home Medications    **Home medications have not yet been reviewed for this encounter**         ALLERGIES  Allergies   Allergen Reactions   • Bactrim [Sulfamethoxazole W-Trimethoprim]        PHYSICAL EXAM  VITAL SIGNS: /65   Pulse 95   Temp 37 °C (98.6 °F)   Resp 17   Ht 1.727 m (5' 8\")   Wt 88.8 kg (195 lb 12.3 oz)   SpO2 94%   BMI 29.77 kg/m²    Oxygen saturation is interpreted as adequate  Constitutional: The patient is awake and verbal he is able to carry on a complete lucid conversation without difficulty.  The patient appears to understand and appreciate everything I am telling him and he appears to have the capacity to make his own medical decisions.  HENT: There are some very slight abrasions to the occipital part of the scalp and a very minimal laceration, none of this will require suturing.  Eyes: Pupils round extraocular motion present no jaundice  Neck: Trachea midline no JVD  Cardiovascular: Regular rate and rhythm  Lungs: Clear and equal bilaterally with no apparent difficulty breathing  Abdomen/Back: Soft and mildly distended particularly in the suprapubic area but really no tenderness or peritoneal findings and bowel sounds are present.  There is no tenderness of the thoracic or lumbar spine and no CVA tenderness with percussion.  Skin: Warm and dry  Musculoskeletal: No acute bony deformity no leg edema or calf tenderness  Neurologic: Patient is awake and verbal and moving all extremities and carrying on a lucid conversation as noted above    CHART REVIEW  I reviewed a hospital note from August 24, 2018 which included laboratory values showing a BUN of 82 and creatinine of 9.52 and a bicarb of 18 with a sodium of 120 and potassium of 5.9.  The patient had a point-of-care urinalysis done on August 23 showing nitrite and leukocyte esterase positive.  The patient had a renal ultrasound on August 24 showing mild to moderate bilateral hydronephrosis and a complicated left renal " cyst with calcifications in the wall of the cyst.  The ER note from August 24 indicates the patient was first found to have laboratory testing showing acute renal failure on August 21.  The patient was admitted to the hospital he was seen by the ER doctor the hospitalist and the nephrologist and it sounds like extensive efforts were made to treat the patient but he ended up refusing treatment and signed out AGAINST MEDICAL ADVICE.  The patient and his  today confirm this.    Laboratory  Today CBC shows white blood cell count of 6.8 hemoglobin is adequate at 12.4 basic metabolic panel shows worsening renal failure with a BUN of 107 and creatinine of 12.07.  The bicarb is 19 and the potassium is elevated at 6.6 the sodium was low at 120.  Liver functions were unremarkable except for a minimally elevated ALT of 60 troponin is normal at less than 0.02 the BMP is slightly elevated at 522 and the INR is normal.    EKG interpretation  Twelve-lead EKG shows sinus rhythm 90 bpm there is baseline wandering there was T-wave inversion in lead III there is no ST elevation or depression and the QTc interval is 431 ms.    Radiology  CT-HEAD W/O   Final Result         1. No acute intracranial abnormality. No evidence of acute intracranial hemorrhage or mass lesion.                     MEDICAL DECISION MAKING and DISPOSITION  In the emergency department an IV was established and the patient was placed on a cardiac monitor although he initially refused the monitor.  The patient has been started on intravenous dextrose and insulin and calcium as well as intravenous fluids.  I have reviewed all the above information in great detail with the patient and his .  The patient has been quite difficult during his ER stay he has been refusing and delaying care extensively and it is taken great effort to convince him to stay and undergo appropriate therapy.  The patient had a bladder scan which shows more than a liter of fluid  in the bladder and I think that his renal failure is likely due to bladder outlet obstruction.  I have discussed with the patient and his  that this could be easily relieved with a Buchanan catheter but the patient is absolutely refusing to undergo this simple procedure at this time.  I have even offered sedation with propofol or Ativan but the patient refuses the catheter.  I have reviewed the case with the hospitalist the hospitalist has had extensive discussions with the patient as well and the patient continues to refuse a catheter but he does consent at this time to be admitted to the hospital so we are going to manage the patient as best we can within the boundaries that he is laying out.  I have advised the patient that without appropriate therapy he is likely to die or suffer severe disability and he cannot be convinced to undergo a catheterization of the bladder at this time.    IMPRESSION  1.  Acute renal failure  2.  Hyperkalemia  3.  Bladder outlet obstruction  4.  The patient is noncompliant with medical therapy  5.  Critical care time with this patient is 45 minutes         Electronically signed by: Storm Oseguera, 8/26/2018 9:12 PM

## 2018-08-27 NOTE — ED NOTES
"Pt states that he fell walking downstairs about 1 hour ago. States he hit his head, has small abrasions to back of head. Bleeding appears controlled. Pt states he tripped over his cat. Denies tripping due to unsteadiness, lightheadedness. Pt denies CP, SOB.    Significant other states that pt was seen at [facility] two days ago for generalized weakness and \"isuses with his kidneys.\" Significant other states that pt \"signed out AMA because he didn't want to stay in the hospital.\" \"They told him that it was life or death, and he would die if he wasn't admitted.\"    Review of previous labs/chart notes renal failure. Pt c/o generalized weakness, fatigue, \"not feeling well.\" Significant other states pt has been sleeping all the time. Pt denies changes in urinary output/frequency/retention. \"I go every couple of hours, but it's all normal.\"    Pt denies significant life events, recent family events/deaths, loss of job, etc. Pt states history of depression. \"I take three medications for depression, but they've been working fine.\"      "

## 2018-08-27 NOTE — PROGRESS NOTES
2 RN skin check with ROCIO Nguyen. Skin not WDL see flow sheet.     Bruising on right hip not open, cut to back of head, small scant serosanguineous drainage open to air

## 2018-08-27 NOTE — ASSESSMENT & PLAN NOTE
-Continue home propranolol and amlodipine, increased the amlodipine dose since benazepril was stopped  -Place PRN labetalol and adjust as needed

## 2018-08-27 NOTE — CARE PLAN
Problem: Knowledge Deficit  Goal: Knowledge of disease process/condition, treatment plan, diagnostic tests, and medications will improve  Outcome: PROGRESSING AS EXPECTED    Goal: Knowledge of the prescribed therapeutic regimen will improve  Outcome: PROGRESSING AS EXPECTED      Problem: Discharge Barriers/Planning  Goal: Patient's continuum of care needs will be met  Outcome: PROGRESSING SLOWER THAN EXPECTED  Reluctant for treatment, particularly straight cath right now

## 2018-08-27 NOTE — PROGRESS NOTES
[facility] Hospitalist Progress Note    Date of Service: 2018    Chief Complaint  67 y.o. male admitted 2018 with a fall. He was found to have a marked elevation in creatinine and obstructive uropathy.     Interval Problem Update  We discussed his urgent need for a marie and he finally agreed to one. 5L initially drained upon placement and he is now feeling a lot better.     Consultants/Specialty  none    Disposition  Home once improved.         Review of Systems   Constitutional: Negative for chills and fever.   HENT: Negative for sore throat.    Eyes: Negative for blurred vision and pain.   Respiratory: Negative for cough and shortness of breath.    Cardiovascular: Negative for chest pain and palpitations.   Gastrointestinal: Positive for abdominal pain. Negative for nausea and vomiting.   Genitourinary: Negative for dysuria and urgency.   Musculoskeletal: Negative for back pain and neck pain.   Skin: Negative for itching and rash.   Neurological: Negative for dizziness, tingling and headaches.   Psychiatric/Behavioral: Negative for depression. The patient does not have insomnia.    All other systems reviewed and are negative.     Physical Exam  Laboratory/Imaging   Hemodynamics  Temp (24hrs), Av.8 °C (98.2 °F), Min:36.3 °C (97.4 °F), Max:37.3 °C (99.1 °F)   Temperature: 36.3 °C (97.4 °F)  Pulse  Av.3  Min: 87  Max: 104 Heart Rate (Monitored): 94  Blood Pressure : 144/70, NIBP: 138/73      Respiratory      Respiration: 16, Pulse Oximetry: 95 %             Fluids    Intake/Output Summary (Last 24 hours) at 18 0833  Last data filed at 18 0800   Gross per 24 hour   Intake                0 ml   Output               34 ml   Net              -34 ml       Nutrition  Orders Placed This Encounter   Procedures   • Diet Order Regular     Standing Status:   Standing     Number of Occurrences:   1     Order Specific Question:   Diet:     Answer:   Regular [1]     Physical Exam   Constitutional: He is  oriented to person, place, and time. He appears well-developed and well-nourished. No distress.   Patient seen and examined  Plan discussed with RN   NILTONT:   Right Ear: External ear normal.   Left Ear: External ear normal.   Nose: Nose normal.   Eyes: Right eye exhibits no discharge. Left eye exhibits no discharge. No scleral icterus.   Neck: No JVD present. No tracheal deviation present.   Cardiovascular: Normal rate, normal heart sounds and intact distal pulses.    No murmur heard.  Cap refill 2sec  Pulses 2+ throughout     Pulmonary/Chest: Effort normal and breath sounds normal. No respiratory distress. He has no wheezes. He has no rales.   Abdominal: Soft. Bowel sounds are normal. He exhibits no distension. There is no tenderness. There is no guarding.   Musculoskeletal: He exhibits no edema or tenderness.   Neurological: He is alert and oriented to person, place, and time.   Skin: Skin is warm and dry. He is not diaphoretic. No erythema.   Normal skin color   Psychiatric: He has a normal mood and affect. His behavior is normal.   Nursing note and vitals reviewed.      Recent Labs      08/24/18   1038  08/26/18 1825 08/27/18 0055   WBC  7.4  6.8  6.3   RBC  4.33*  4.24*  3.63*   HEMOGLOBIN  12.3*  12.4*  10.7*   HEMATOCRIT  36.4*  34.7*  30.0*   MCV  84.1  81.8  82.6   MCH  28.4  29.2  29.5   MCHC  33.8  35.7*  35.7*   RDW  38.5  36.8  37.7   PLATELETCT  125*  156*  148*   MPV  9.2  9.4  9.3     Recent Labs      08/26/18   1825  08/27/18   0055  08/27/18   0458   SODIUM  120*  118*  119*   POTASSIUM  6.6*  6.4*  6.2*   CHLORIDE  85*  87*  87*   CO2  19*  20  18*   GLUCOSE  117*  90  89   BUN  107*  107*  105*   CREATININE  12.07*  12.13*  12.16*   CALCIUM  8.5  8.5  8.9     Recent Labs      08/26/18 1825   APTT  34.3   INR  1.05     Recent Labs      08/26/18 1825   BNPBTYPENAT  522*              Assessment/Plan     Hyperkalemia   Assessment & Plan    Resolved after marie placement. Stop bicarb         Acute renal failure (ARF) (HCC)   Assessment & Plan    2/2 obstructive uropathy  Went in immediately to discuss danger of no marie placement and likely death if he refused.   He ultimately agreed and a marie was placed with initially 5L out.   Creatinine improved throughout the day.   nephro likely not needed at this time but are aware the case.           Anemia   Assessment & Plan    Check fe, b12,folate        Hypertension   Assessment & Plan    Propranolol  norvasc        Obstructive uropathy   Assessment & Plan    Marie placed and outpatient urology follow up needed.         High anion gap metabolic acidosis   Assessment & Plan    Treat obstructive uropathy and use IV fluids.         Hyponatremia   Assessment & Plan    Up to 127  q4 bmp and trend  Stop bicarb but will use NS for now.         Chronic prescription benzodiazepine use- (present on admission)   Assessment & Plan    Would avoid these here        Recurrent major depressive disorder, in full remission (HCC)- (present on admission)   Assessment & Plan    -Continue home meds        Essential hypertension- (present on admission)   Assessment & Plan    -Continue home propranolol and amlodipine, increased the amlodipine dose since benazepril was stopped  -Place PRN labetalol and adjust as needed        Anxiety- (present on admission)   Assessment & Plan    Controlled now          Quality-Core Measures   Reviewed items::  EKG reviewed, Labs reviewed, Medications reviewed and Radiology images reviewed  Marie catheter::  Urinary Tract Retention or Urinary Tract Obstruction  DVT prophylaxis pharmacological::  Heparin  DVT prophylaxis - mechanical:  SCDs  Ulcer Prophylaxis::  Not indicated  Assessed for rehabilitation services:  Patient was assess for and/or received rehabilitation services during this hospitalization

## 2018-08-27 NOTE — PROGRESS NOTES
Received patient from ER, report received at bedside, discussed POC with patient, instructed on how to use call light and to request help before ambulation, reviewed his refusal of marie and his reasons why

## 2018-08-27 NOTE — PROGRESS NOTES
Spoke to Dr Russell regarding K+ being 6.2, consistently high CR and BUN, and urinary retention being above 1000 along with patient's continued refusal of marie, no new orders

## 2018-08-27 NOTE — ED NOTES
"IV started in RAC with 20g x1 attempt. Labs drawn from start, sent to lab for processing. IV flushed with NS without difficulty. Pt tolerated well.    Pt refused cardiac monitor, serial vitals, and continuous pulse ox. States \"I am a bad patient.\"    Plan of care discussed with pt and significant other. Pt states, \"we'll see how I feel when you ask for something else.\"    Pt up ambulatory to bathroom steady on feet with 0 s/s distress noted. Scant (approx 1cc) clear yellow urine specimen collected - sent to lab for processing.   "

## 2018-08-27 NOTE — CARE PLAN
Problem: Safety  Goal: Will remain free from injury  Outcome: PROGRESSING AS EXPECTED    Intervention: Provide assistance with mobility  Pt verbalizes understanding of fall prevention  Use of call light for assist to bathroom

## 2018-08-27 NOTE — ED NOTES
"ERP at bedside.     Pt amendable to having a head CT and labs drawn. Pt states that he \"will not be admitted to the hospital.\" \"I do not like hospitals.\"  "

## 2018-08-28 PROBLEM — N40.0 PROSTATIC HYPERTROPHY: Status: ACTIVE | Noted: 2018-08-28

## 2018-08-28 LAB
ALBUMIN SERPL BCP-MCNC: 2.8 G/DL (ref 3.2–4.9)
ALBUMIN/GLOB SERPL: 1.1 G/DL
ALP SERPL-CCNC: 73 U/L (ref 30–99)
ALT SERPL-CCNC: 49 U/L (ref 2–50)
ANION GAP SERPL CALC-SCNC: 7 MMOL/L (ref 0–11.9)
AST SERPL-CCNC: 27 U/L (ref 12–45)
BASOPHILS # BLD AUTO: 0.9 % (ref 0–1.8)
BASOPHILS # BLD: 0.07 K/UL (ref 0–0.12)
BILIRUB SERPL-MCNC: 0.8 MG/DL (ref 0.1–1.5)
BUN SERPL-MCNC: 20 MG/DL (ref 8–22)
CALCIUM SERPL-MCNC: 8.9 MG/DL (ref 8.4–10.2)
CHLORIDE SERPL-SCNC: 107 MMOL/L (ref 96–112)
CO2 SERPL-SCNC: 21 MMOL/L (ref 20–33)
CREAT SERPL-MCNC: 1.64 MG/DL (ref 0.5–1.4)
EOSINOPHIL # BLD AUTO: 0.11 K/UL (ref 0–0.51)
EOSINOPHIL NFR BLD: 1.4 % (ref 0–6.9)
ERYTHROCYTE [DISTWIDTH] IN BLOOD BY AUTOMATED COUNT: 39.5 FL (ref 35.9–50)
FOLATE SERPL-MCNC: 19.5 NG/ML
GLOBULIN SER CALC-MCNC: 2.5 G/DL (ref 1.9–3.5)
GLUCOSE SERPL-MCNC: 108 MG/DL (ref 65–99)
HCT VFR BLD AUTO: 31.4 % (ref 42–52)
HGB BLD-MCNC: 10.8 G/DL (ref 14–18)
IMM GRANULOCYTES # BLD AUTO: 0.28 K/UL (ref 0–0.11)
IMM GRANULOCYTES NFR BLD AUTO: 3.5 % (ref 0–0.9)
IRON SATN MFR SERPL: 30 % (ref 15–55)
IRON SERPL-MCNC: 69 UG/DL (ref 50–180)
LYMPHOCYTES # BLD AUTO: 1.81 K/UL (ref 1–4.8)
LYMPHOCYTES NFR BLD: 22.7 % (ref 22–41)
MAGNESIUM SERPL-MCNC: 1.8 MG/DL (ref 1.5–2.5)
MCH RBC QN AUTO: 29.2 PG (ref 27–33)
MCHC RBC AUTO-ENTMCNC: 34.4 G/DL (ref 33.7–35.3)
MCV RBC AUTO: 84.9 FL (ref 81.4–97.8)
MONOCYTES # BLD AUTO: 1.27 K/UL (ref 0–0.85)
MONOCYTES NFR BLD AUTO: 15.9 % (ref 0–13.4)
NEUTROPHILS # BLD AUTO: 4.45 K/UL (ref 1.82–7.42)
NEUTROPHILS NFR BLD: 55.6 % (ref 44–72)
NRBC # BLD AUTO: 0 K/UL
NRBC BLD-RTO: 0 /100 WBC
PHOSPHATE SERPL-MCNC: 4 MG/DL (ref 2.5–4.5)
PLATELET # BLD AUTO: 224 K/UL (ref 164–446)
PMV BLD AUTO: 9 FL (ref 9–12.9)
POTASSIUM SERPL-SCNC: 4.5 MMOL/L (ref 3.6–5.5)
PROT SERPL-MCNC: 5.3 G/DL (ref 6–8.2)
RBC # BLD AUTO: 3.7 M/UL (ref 4.7–6.1)
SODIUM SERPL-SCNC: 135 MMOL/L (ref 135–145)
TIBC SERPL-MCNC: 228 UG/DL (ref 250–450)
TSH SERPL DL<=0.005 MIU/L-ACNC: 5.16 UIU/ML (ref 0.38–5.33)
VIT B12 SERPL-MCNC: 1286 PG/ML (ref 211–911)
WBC # BLD AUTO: 8 K/UL (ref 4.8–10.8)

## 2018-08-28 PROCEDURE — 85025 COMPLETE CBC W/AUTO DIFF WBC: CPT

## 2018-08-28 PROCEDURE — 83540 ASSAY OF IRON: CPT

## 2018-08-28 PROCEDURE — 700102 HCHG RX REV CODE 250 W/ 637 OVERRIDE(OP): Performed by: INTERNAL MEDICINE

## 2018-08-28 PROCEDURE — 82607 VITAMIN B-12: CPT

## 2018-08-28 PROCEDURE — A9270 NON-COVERED ITEM OR SERVICE: HCPCS | Performed by: INTERNAL MEDICINE

## 2018-08-28 PROCEDURE — 83735 ASSAY OF MAGNESIUM: CPT

## 2018-08-28 PROCEDURE — 80053 COMPREHEN METABOLIC PANEL: CPT

## 2018-08-28 PROCEDURE — 700111 HCHG RX REV CODE 636 W/ 250 OVERRIDE (IP): Performed by: INTERNAL MEDICINE

## 2018-08-28 PROCEDURE — 700105 HCHG RX REV CODE 258: Performed by: HOSPITALIST

## 2018-08-28 PROCEDURE — 82746 ASSAY OF FOLIC ACID SERUM: CPT

## 2018-08-28 PROCEDURE — 99232 SBSQ HOSP IP/OBS MODERATE 35: CPT | Performed by: INTERNAL MEDICINE

## 2018-08-28 PROCEDURE — 770020 HCHG ROOM/CARE - TELE (206)

## 2018-08-28 PROCEDURE — 83550 IRON BINDING TEST: CPT

## 2018-08-28 PROCEDURE — 84100 ASSAY OF PHOSPHORUS: CPT

## 2018-08-28 PROCEDURE — 84443 ASSAY THYROID STIM HORMONE: CPT

## 2018-08-28 RX ORDER — BUSPIRONE HYDROCHLORIDE 5 MG/1
5 TABLET ORAL 3 TIMES DAILY
Status: DISCONTINUED | OUTPATIENT
Start: 2018-08-28 | End: 2018-08-29 | Stop reason: HOSPADM

## 2018-08-28 RX ORDER — HYDRALAZINE HYDROCHLORIDE 25 MG/1
25 TABLET, FILM COATED ORAL EVERY 8 HOURS
Status: DISCONTINUED | OUTPATIENT
Start: 2018-08-28 | End: 2018-08-29 | Stop reason: HOSPADM

## 2018-08-28 RX ADMIN — HEPARIN SODIUM 5000 UNITS: 5000 INJECTION, SOLUTION INTRAVENOUS; SUBCUTANEOUS at 05:18

## 2018-08-28 RX ADMIN — BUPROPION HYDROCHLORIDE 150 MG: 150 TABLET, FILM COATED, EXTENDED RELEASE ORAL at 05:15

## 2018-08-28 RX ADMIN — SODIUM CHLORIDE: 9 INJECTION, SOLUTION INTRAVENOUS at 02:47

## 2018-08-28 RX ADMIN — DIPHENHYDRAMINE HCL 25 MG: 25 TABLET ORAL at 21:53

## 2018-08-28 RX ADMIN — HEPARIN SODIUM 5000 UNITS: 5000 INJECTION, SOLUTION INTRAVENOUS; SUBCUTANEOUS at 14:30

## 2018-08-28 RX ADMIN — ESCITALOPRAM OXALATE 20 MG: 10 TABLET ORAL at 05:15

## 2018-08-28 RX ADMIN — BUSPIRONE HYDROCHLORIDE 5 MG: 5 TABLET ORAL at 17:22

## 2018-08-28 RX ADMIN — FINASTERIDE 5 MG: 5 TABLET, FILM COATED ORAL at 05:14

## 2018-08-28 RX ADMIN — HYDRALAZINE HYDROCHLORIDE 25 MG: 25 TABLET ORAL at 21:53

## 2018-08-28 RX ADMIN — AMLODIPINE BESYLATE 10 MG: 5 TABLET ORAL at 05:14

## 2018-08-28 RX ADMIN — TAMSULOSIN HYDROCHLORIDE 0.4 MG: 0.4 CAPSULE ORAL at 08:38

## 2018-08-28 RX ADMIN — BUSPIRONE HYDROCHLORIDE 5 MG: 5 TABLET ORAL at 12:45

## 2018-08-28 RX ADMIN — HEPARIN SODIUM 5000 UNITS: 5000 INJECTION, SOLUTION INTRAVENOUS; SUBCUTANEOUS at 21:53

## 2018-08-28 RX ADMIN — PROPRANOLOL HYDROCHLORIDE 20 MG: 20 TABLET ORAL at 05:13

## 2018-08-28 RX ADMIN — SODIUM CHLORIDE: 9 INJECTION, SOLUTION INTRAVENOUS at 11:34

## 2018-08-28 RX ADMIN — ACETAMINOPHEN 650 MG: 325 TABLET, FILM COATED ORAL at 17:24

## 2018-08-28 RX ADMIN — HYDRALAZINE HYDROCHLORIDE 25 MG: 25 TABLET ORAL at 14:30

## 2018-08-28 ASSESSMENT — PAIN SCALES - GENERAL
PAINLEVEL_OUTOF10: 0
PAINLEVEL_OUTOF10: 6
PAINLEVEL_OUTOF10: 0

## 2018-08-28 ASSESSMENT — ENCOUNTER SYMPTOMS
FEVER: 0
SORE THROAT: 0
ABDOMINAL PAIN: 0
SHORTNESS OF BREATH: 0
EYE REDNESS: 0
NAUSEA: 0
VOMITING: 0
MUSCULOSKELETAL NEGATIVE: 1
DIARRHEA: 0
CHILLS: 0
HEADACHES: 0
DIZZINESS: 0
PSYCHIATRIC NEGATIVE: 1
EYE DISCHARGE: 0
COUGH: 0

## 2018-08-28 ASSESSMENT — PATIENT HEALTH QUESTIONNAIRE - PHQ9
2. FEELING DOWN, DEPRESSED, IRRITABLE, OR HOPELESS: NOT AT ALL
SUM OF ALL RESPONSES TO PHQ9 QUESTIONS 1 AND 2: 0
1. LITTLE INTEREST OR PLEASURE IN DOING THINGS: NOT AT ALL
SUM OF ALL RESPONSES TO PHQ9 QUESTIONS 1 AND 2: 0
1. LITTLE INTEREST OR PLEASURE IN DOING THINGS: NOT AT ALL
2. FEELING DOWN, DEPRESSED, IRRITABLE, OR HOPELESS: NOT AT ALL

## 2018-08-28 NOTE — PROGRESS NOTES
[facility] Hospitalist Progress Note    Date of Service: 2018    Chief Complaint  67 y.o. male admitted 2018 with fall downstairs at home with head trauma.  On arrival he was found to have significantly elevated creatinine, potassium.  Apparently he knew this 2 days prior to admission had imaging showing obstructive uropathy but left AMA.  Initially he refused a Buchanan catheter but ultimately consented.  5 L were obtained immediately upon placement of Buchanan.  He was started on Flomax, Proscar on     Interval Problem Update  Friend at bedside reviewed care plan  Suspect he has had chronic retention which has worsened over time  Discussed plan with RN    Consultants/Specialty  None    Disposition  Anticipate home        Review of Systems   Constitutional: Negative for chills, fever and malaise/fatigue.   HENT: Negative for congestion and sore throat.    Eyes: Negative for discharge and redness.   Respiratory: Negative for cough and shortness of breath.    Cardiovascular: Negative for chest pain.   Gastrointestinal: Negative for abdominal pain, diarrhea, nausea and vomiting.   Genitourinary: Negative for hematuria.        Mild discomfort from Buchanan but not pain   Musculoskeletal: Negative.    Skin: Negative for itching and rash.   Neurological: Negative for dizziness and headaches.   Psychiatric/Behavioral: Negative.       Physical Exam  Laboratory/Imaging   Hemodynamics  Temp (24hrs), Av.6 °C (97.9 °F), Min:36.3 °C (97.3 °F), Max:37.2 °C (99 °F)   Temperature: 37.2 °C (99 °F)  Pulse  Av  Min: 87  Max: 106    Blood Pressure : (!) 164/88 (rn notified)      Respiratory      Respiration: 18, Pulse Oximetry: 94 %             Fluids    Intake/Output Summary (Last 24 hours) at 18 1219  Last data filed at 18 1012   Gross per 24 hour   Intake             2488 ml   Output             6650 ml   Net            -4162 ml       Nutrition  Orders Placed This Encounter   Procedures   • Diet Order Regular      Standing Status:   Standing     Number of Occurrences:   1     Order Specific Question:   Diet:     Answer:   Regular [1]     Physical Exam   Constitutional: He is oriented to person, place, and time. He appears well-developed and well-nourished. No distress.   HENT:   Head: Normocephalic and atraumatic.   Right Ear: External ear normal.   Left Ear: External ear normal.   Nose: Nose normal.   Mouth/Throat: Oropharynx is clear and moist.   Eyes: Pupils are equal, round, and reactive to light. Conjunctivae and EOM are normal. Right eye exhibits no discharge. Left eye exhibits no discharge. No scleral icterus.   Neck: Neck supple.   Cardiovascular: Normal rate and regular rhythm.    Pulmonary/Chest: Effort normal and breath sounds normal.   Abdominal: Soft. Bowel sounds are normal. He exhibits no distension. There is no tenderness.   Genitourinary:   Genitourinary Comments: Buchanan with light yellow urine   Musculoskeletal: He exhibits no edema or tenderness.   Neurological: He is alert and oriented to person, place, and time. No cranial nerve deficit.   Skin: Skin is warm and dry. He is not diaphoretic.   Psychiatric: He has a normal mood and affect.   Nursing note and vitals reviewed.      Recent Labs      08/26/18   1825  08/27/18   0055  08/28/18   0434   WBC  6.8  6.3  8.0   RBC  4.24*  3.63*  3.70*   HEMOGLOBIN  12.4*  10.7*  10.8*   HEMATOCRIT  34.7*  30.0*  31.4*   MCV  81.8  82.6  84.9   MCH  29.2  29.5  29.2   MCHC  35.7*  35.7*  34.4   RDW  36.8  37.7  39.5   PLATELETCT  156*  148*  224   MPV  9.4  9.3  9.0     Recent Labs      08/27/18   1132  08/27/18   1548  08/28/18   0434   SODIUM  127*  132*  135   POTASSIUM  5.1  4.7  4.5   CHLORIDE  94*  101  107   CO2  20  21  21   GLUCOSE  127*  201*  108*   BUN  86*  61*  20   CREATININE  8.26*  5.50*  1.64*   CALCIUM  8.3*  8.6  8.9     Recent Labs      08/26/18   1825   APTT  34.3   INR  1.05     Recent Labs      08/26/18 1825   BNPBTYPENAT  522*               Assessment/Plan     Hyperkalemia   Assessment & Plan    Due to AK I, resolved        Acute renal failure (ARF) ([facility])   Assessment & Plan    2/2 obstructive uropathy  Improving  BMP in AM          Prostatic hypertrophy   Assessment & Plan    With obstructive uropathy  PSA borderline 3.98  Previously on saw palmetto  Now on Flomax, Proscar  Buchanan in place-if renal function continues improved tomorrow will do trial discontinuation        Anemia   Assessment & Plan    Iron b12, folate ordered        High anion gap metabolic acidosis   Assessment & Plan    Due to the AK I, resolved        Hyponatremia   Assessment & Plan    Resolved        Recurrent major depressive disorder, in full remission ([facility])- (present on admission)   Assessment & Plan    -Continue home meds        Essential hypertension- (present on admission)   Assessment & Plan    Propranolol  norvasc  Higher than goal add hydralazine  Reduce IVF        Anxiety- (present on admission)   Assessment & Plan    Resume buspar          Quality-Core Measures   Reviewed items::  Labs reviewed and Medications reviewed  Buchanan catheter::  Urinary Tract Retention or Urinary Tract Obstruction  DVT prophylaxis pharmacological::  Heparin  Ulcer Prophylaxis::  Not indicated  Assessed for rehabilitation services:  Patient returned to prior level of function, rehabilitation not indicated at this time

## 2018-08-28 NOTE — CARE PLAN
Problem: Safety  Goal: Will remain free from injury  Outcome: PROGRESSING AS EXPECTED  No falls this shift. Ambulated with SBA, gait steady. Calling appropriately for assistance. Falls precautions in place: bed in lowest   and locked position, side rails raised x 2, room near nurses station, call light within reach, nonslip socks on, purposeful hourly rounding.       Problem: Urinary Elimination:  Goal: Ability to reestablish a normal urinary elimination pattern will improve  Outcome: PROGRESSING AS EXPECTED  Buchanan in place to gravity. Draining adequate amounts clear/yellow UOP. No complaints of pain/discomfort.   Catheter care provided by CALVIN Hennessy at start of shift. IVF continues at 100mL/hr

## 2018-08-28 NOTE — PROGRESS NOTES
Patient stable, vitals wnl, alert times 4, marie catheter draining yellow urine, iv access patent with NS @ 100cc/hr, room air, up with standby assist, skin disparities noted, no complaints of dizziness, allergies noted, full code, on tele, pt passed off to ROCIO Lopez, report given.

## 2018-08-28 NOTE — CARE PLAN
Problem: Communication  Goal: The ability to communicate needs accurately and effectively will improve  Outcome: PROGRESSING AS EXPECTED    Intervention: Educate patient and significant other/support system about the plan of care, procedures, treatments, medications and allow for questions  Pt provided with teaching for Buspar and hydrochlorothiazide  Verbalizes understanding

## 2018-08-28 NOTE — PROGRESS NOTES
1910: Bedside report received from ROCIO Lopez. Patient resting comfortably in bed. No questions/concerns/needs verbalized at this time. Falls precautions remain in place.    1930: Assisted back from bathroom, SBA, gait steady.    2000: Rounded on patient, sitting up in bed, resting comfortably. Alert and oriented x 4. VSS and afebrile. SpO2 WNL on RA. Denies pain/discomfort at this time. IV patent, NS @ 100mL/hr.  Buchanan draining adequately. Plan of care discussed and questions answered. Needs addressed. Falls precautions in place. No further questions or concerns at this time. Will continue to monitor.      -------------------------------------------------------------------------------------------------------------------------------------------    Telemetry Summary    Rhythm ST  Rate low 100s  Ectopy None  AK 0.18  QRS 0.08  QT 0.40

## 2018-08-28 NOTE — PROGRESS NOTES
assumed care of pt  AAOX4  Skin warm and dry iv infusing and patent   No distress  Buchanan cath patent and draining clear yellow urine

## 2018-08-28 NOTE — ASSESSMENT & PLAN NOTE
With obstructive uropathy  PSA borderline 3.98  Previously on saw palmetto  Now on Flomax, Proscar  Buchanan in place-if renal function continues improved tomorrow will do trial discontinuation

## 2018-08-28 NOTE — DISCHARGE PLANNING
Care Transition Team Assessment    Pt resides with his SO.  Pt was not use any DME for ambulation or was on service with HH.  Pt receives SSI and $100 in food stamps.  Pt has SCP and Medicaid for insurance coverage.  There are no SS needs identified at this time.  Pt will likely dc home once medically cleared.     Information Source Chart  Information Given By: Patient  Who is responsible for making decisions for patient? : Patient    Readmission Evaluation  Is this a readmission?: No    Elopement Risk  Legal Hold: No  Ambulatory or Self Mobile in Wheelchair: Yes  Disoriented: No  Psychiatric Symptoms: None  History of Wandering: No  Elopement this Admit: No  Vocalizing Wanting to Leave: No  Displays Behaviors, Body Language Wanting to Leave: No-Not at Risk for Elopement  Elopement Risk: Not at Risk for Elopement    Interdisciplinary Discharge Planning  Does Admitting Nurse Feel This Could be a Complex Discharge?: Yes  Patient or legal guardian wants to designate a caregiver (see row info): No  Durable Medical Equipment: Not Applicable    Discharge Preparedness  What is your plan after discharge?: Home with help  What are your discharge supports?: Other (comment) (Significant other)  Prior Functional Level: Ambulatory    Functional Assesment  Prior Functional Level: Ambulatory    Finances  Financial Barriers to Discharge: No  Prescription Coverage: Yes    Vision / Hearing Impairment  Vision Impairment : Yes  Right Eye Vision: Impaired, Wears Glasses  Left Eye Vision: Impaired, Wears Glasses  Hearing Impairment : No    Values / Beliefs / Concerns  Values / Beliefs Concerns : No  Spiritual Requests During Hospitalization: No    Domestic Abuse  Have you ever been the victim of abuse or violence?: No  Physical Abuse or Sexual Abuse: No  Verbal Abuse or Emotional Abuse: No  Possible Abuse Reported to:: Not Applicable    Discharge Risks or Barriers  Discharge risks or barriers?: No    Anticipated Discharge  Information  Anticipated discharge disposition: Home

## 2018-08-29 ENCOUNTER — PATIENT OUTREACH (OUTPATIENT)
Dept: HEALTH INFORMATION MANAGEMENT | Facility: OTHER | Age: 68
End: 2018-08-29

## 2018-08-29 VITALS
WEIGHT: 195.33 LBS | OXYGEN SATURATION: 95 % | SYSTOLIC BLOOD PRESSURE: 153 MMHG | HEART RATE: 100 BPM | BODY MASS INDEX: 29.6 KG/M2 | DIASTOLIC BLOOD PRESSURE: 70 MMHG | HEIGHT: 68 IN | TEMPERATURE: 98.3 F | RESPIRATION RATE: 18 BRPM

## 2018-08-29 PROBLEM — I49.8 VENTRICULAR TRIGEMINY: Status: ACTIVE | Noted: 2018-08-29

## 2018-08-29 LAB
ANION GAP SERPL CALC-SCNC: 8 MMOL/L (ref 0–11.9)
BUN SERPL-MCNC: 5 MG/DL (ref 8–22)
CALCIUM SERPL-MCNC: 8.8 MG/DL (ref 8.4–10.2)
CHLORIDE SERPL-SCNC: 110 MMOL/L (ref 96–112)
CO2 SERPL-SCNC: 21 MMOL/L (ref 20–33)
CREAT SERPL-MCNC: 0.77 MG/DL (ref 0.5–1.4)
GLUCOSE SERPL-MCNC: 110 MG/DL (ref 65–99)
POTASSIUM SERPL-SCNC: 4 MMOL/L (ref 3.6–5.5)
SODIUM SERPL-SCNC: 139 MMOL/L (ref 135–145)

## 2018-08-29 PROCEDURE — A9270 NON-COVERED ITEM OR SERVICE: HCPCS | Performed by: INTERNAL MEDICINE

## 2018-08-29 PROCEDURE — 700111 HCHG RX REV CODE 636 W/ 250 OVERRIDE (IP): Performed by: INTERNAL MEDICINE

## 2018-08-29 PROCEDURE — 99239 HOSP IP/OBS DSCHRG MGMT >30: CPT | Performed by: INTERNAL MEDICINE

## 2018-08-29 PROCEDURE — 80048 BASIC METABOLIC PNL TOTAL CA: CPT

## 2018-08-29 PROCEDURE — 700102 HCHG RX REV CODE 250 W/ 637 OVERRIDE(OP): Performed by: INTERNAL MEDICINE

## 2018-08-29 RX ORDER — FINASTERIDE 5 MG/1
5 TABLET, FILM COATED ORAL DAILY
Qty: 30 TAB | Refills: 12 | Status: SHIPPED | OUTPATIENT
Start: 2018-08-30

## 2018-08-29 RX ORDER — TAMSULOSIN HYDROCHLORIDE 0.4 MG/1
0.4 CAPSULE ORAL 2 TIMES DAILY
Qty: 60 CAP | Refills: 12 | Status: SHIPPED | OUTPATIENT
Start: 2018-08-29

## 2018-08-29 RX ADMIN — TAMSULOSIN HYDROCHLORIDE 0.4 MG: 0.4 CAPSULE ORAL at 09:37

## 2018-08-29 RX ADMIN — FINASTERIDE 5 MG: 5 TABLET, FILM COATED ORAL at 05:15

## 2018-08-29 RX ADMIN — BUSPIRONE HYDROCHLORIDE 5 MG: 5 TABLET ORAL at 05:15

## 2018-08-29 RX ADMIN — AMLODIPINE BESYLATE 10 MG: 5 TABLET ORAL at 05:15

## 2018-08-29 RX ADMIN — BUSPIRONE HYDROCHLORIDE 5 MG: 5 TABLET ORAL at 13:55

## 2018-08-29 RX ADMIN — ESCITALOPRAM OXALATE 20 MG: 10 TABLET ORAL at 05:15

## 2018-08-29 RX ADMIN — HYDRALAZINE HYDROCHLORIDE 25 MG: 25 TABLET ORAL at 05:14

## 2018-08-29 RX ADMIN — HYDRALAZINE HYDROCHLORIDE 25 MG: 25 TABLET ORAL at 13:55

## 2018-08-29 RX ADMIN — PROPRANOLOL HYDROCHLORIDE 20 MG: 20 TABLET ORAL at 05:15

## 2018-08-29 RX ADMIN — BUPROPION HYDROCHLORIDE 150 MG: 150 TABLET, FILM COATED, EXTENDED RELEASE ORAL at 05:15

## 2018-08-29 RX ADMIN — HEPARIN SODIUM 5000 UNITS: 5000 INJECTION, SOLUTION INTRAVENOUS; SUBCUTANEOUS at 05:14

## 2018-08-29 ASSESSMENT — PAIN SCALES - GENERAL: PAINLEVEL_OUTOF10: 0

## 2018-08-29 NOTE — PROGRESS NOTES
Discharge order written. IV removed, patient tolerated well. Leg bag placed on patient. Overnight drainage bag given to patient. Education done on how to change and print out given for patient to follow on marie care. Belongings gathered. AVS printed, reviewed and copy signed and placed on the chart. Patient has no further questions. Discharged in good ambulatory condition home with friend.

## 2018-08-29 NOTE — PROGRESS NOTES
Telemetry Shift Summary    Rhythm SR-ST  HR Range   Ectopy PVC's F  Measurements .16/.10/.34        Normal Values  Rhythm SR  HR Range    Measurements 0.12-0.20 / 0.06-0.10  / 0.30-0.52

## 2018-08-29 NOTE — CARE PLAN
Problem: Safety  Goal: Will remain free from injury  Outcome: PROGRESSING AS EXPECTED  Patient steady with ambulation. Uses call light appropriately. Safety measures in place.     Problem: Urinary Elimination:  Goal: Ability to reestablish a normal urinary elimination pattern will improve  Outcome: PROGRESSING SLOWER THAN EXPECTED  Per urology, MD spoke with patient and he will continue with catheter during stay and on discharge will continue. Will change him to a leg bag and provide education.

## 2018-08-29 NOTE — PROGRESS NOTES
Received report from ROCIO Lopez. Patient resting comfortably in bed. IV infusing, marie draining clear yellow. No complaints. Safety measures in place.     Patient up walking in halls. Increase in ectopy, Frequent PVCs and bigeminy. Strip printed. MD notified.     Up to shower. Tolerated well.

## 2018-08-29 NOTE — DISCHARGE INSTRUCTIONS
DC home with marie and leg bag   Follow up with primary care   Follow up with Urology    Marie Catheter Care, Adult    A Marie catheter is a soft, flexible tube that is placed into the bladder to drain urine. A Marie catheter may be inserted if:  · You leak urine or are not able to control when you urinate (urinary incontinence).  · You are not able to urinate when you need to (urinary retention).  · You had prostate surgery or surgery on the genitals.  · You have certain medical conditions, such as multiple sclerosis, dementia, or a spinal cord injury.  If you are going home with a Marie catheter in place, follow the instructions below.    TAKING CARE OF THE CATHETER  1. Wash your hands with soap and water.  2. Using mild soap and warm water on a clean washcloth:  ¨ Clean the area on your body closest to the catheter insertion site using a circular motion, moving away from the catheter. Never wipe toward the catheter because this could sweep bacteria up into the urethra and cause infection.  ¨ Remove all traces of soap. Pat the area dry with a clean towel. For males, reposition the foreskin.  3. Attach the catheter to your leg so there is no tension on the catheter. Use adhesive tape or a leg strap. If you are using adhesive tape, remove any sticky residue left behind by the previous tape you used.  4. Keep the drainage bag below the level of the bladder, but keep it off the floor.  5. Check throughout the day to be sure the catheter is working and urine is draining freely. Make sure the tubing does not become kinked.  6. Do not pull on the catheter or try to remove it. Pulling could damage internal tissues.  7.   TAKING CARE OF THE DRAINAGE BAGS  You will be given two drainage bags to take home. One is a large overnight drainage bag, and the other is a smaller leg bag that fits underneath clothing. You may wear the overnight bag at any time, but you should never wear the smaller leg bag at night. Follow the  instructions below for how to empty, change, and clean your drainage bags.    Emptying the Drainage Bag  You must empty your drainage bag when it is  -½ full or at least 2-3 times a day.  1. Wash your hands with soap and water.  2. Keep the drainage bag below your hips, below the level of your bladder. This stops urine from going back into the tubing and into your bladder.  3. Hold the dirty bag over the toilet or a clean container.  4. Open the pour spout at the bottom of the bag and empty the urine into the toilet or container. Do not let the pour spout touch the toilet, container, or any other surface. Doing so can place bacteria on the bag, which can cause an infection.  5. Clean the pour spout with a gauze pad or cotton ball that has rubbing alcohol on it.  6. Close the pour spout.  7. Attach the bag to your leg with adhesive tape or a leg strap.  8. Wash your hands well.    Changing the Drainage Bag  Change your drainage bag once a month or sooner if it starts to smell bad or look dirty. Below are steps to follow when changing the drainage bag.  1. Wash your hands with soap and water.  2. Pinch off the rubber catheter so that urine does not spill out.  3. Disconnect the catheter tube from the drainage tube at the connection valve. Do not let the tubes touch any surface.  4. Clean the end of the catheter tube with an alcohol wipe. Use a different alcohol wipe to clean the end of the drainage tube.  5. Connect the catheter tube to the drainage tube of the clean drainage bag.  6. Attach the new bag to the leg with adhesive tape or a leg strap. Avoid attaching the new bag too tightly.  7. Wash your hands well.    Cleaning the Drainage Bag  1. Wash your hands with soap and water.  2. Wash the bag in warm, soapy water.  3. Rinse the bag thoroughly with warm water.  4. Fill the bag with a solution of white vinegar and water (1 cup vinegar to 1 qt warm water [.2 L vinegar to 1 L warm water]). Close the bag and soak it  for 30 minutes in the solution.  5. Rinse the bag with warm water.  6. Hang the bag to dry with the pour spout open and hanging downward.  7. Store the clean bag (once it is dry) in a clean plastic bag.  8. Wash your hands well.    PREVENTING INFECTION  · Wash your hands before and after handling your catheter.  · Take showers daily and wash the area where the catheter enters your body. Do not take baths. Replace wet leg straps with dry ones, if this applies.  · Do not use powders, sprays, or lotions on the genital area. Only use creams, lotions, or ointments as directed by your caregiver.  · For females, wipe from front to back after each bowel movement.  · Drink enough fluids to keep your urine clear or pale yellow unless you have a fluid restriction.  · Do not let the drainage bag or tubing touch or lie on the floor.  · Wear cotton underwear to absorb moisture and to keep your .    SEEK MEDICAL CARE IF:   · Your urine is cloudy or smells unusually bad.  · Your catheter becomes clogged.  · You are not draining urine into the bag or your bladder feels full.  · Your catheter starts to leak.    SEEK IMMEDIATE MEDICAL CARE IF:   · You have pain, swelling, redness, or pus where the catheter enters the body.  · You have pain in the abdomen, legs, lower back, or bladder.  · You have a fever.  · You see blood fill the catheter, or your urine is pink or red.  · You have nausea, vomiting, or chills.  · Your catheter gets pulled out.    MAKE SURE YOU:   · Understand these instructions.  · Will watch your condition.  · Will get help right away if you are not doing well or get worse.     This information is not intended to replace advice given to you by your health care provider. Make sure you discuss any questions you have with your health care provider.     Document Released: 12/18/2006 Document Revised: 05/04/2015 Document Reviewed: 12/09/2013  Elsevier Interactive Patient Education ©2016 Elsevier  "Inc.      Discharge Instructions    Discharged to home by car with friend. Discharged via walking, hospital escort: Refused.  Special equipment needed: Not Applicable    Be sure to schedule a follow-up appointment with your primary care doctor or any specialists as instructed.     Discharge Plan:   Diet Plan: Discussed  Activity Level: Discussed  Confirmed Follow up Appointment: Appointment Scheduled  Confirmed Symptoms Management: Discussed  Medication Reconciliation Updated: Yes  Influenza Vaccine Indication: Not indicated: Previously immunized this influenza season and > 8 years of age    I understand that a diet low in cholesterol, fat, and sodium is recommended for good health. Unless I have been given specific instructions below for another diet, I accept this instruction as my diet prescription.     Diet information from [facility]:    Studies suggest that the following factors may lessen BPH symptoms in men:  · A low-fat diet  · Four or more servings of vegetables a day  · A high level of physical activity and no \"belly fat\"  Looking at diet specifically, the following nutrients appear helpful:  · Vitamin C. Vegetables highest in vitamin C include bell peppers, broccoli, [place] sprouts, kohlrabi, snow or snap peas, cauliflower, kale, and tomato or vegetable juices.  · Zinc. Foods higher in zinc include oysters, crab, baked beans, duck, lamb and beef (lean).    Special Instructions: None    · Is patient discharged on Warfarin / Coumadin?   No     Depression / Suicide Risk    As you are discharged from this [facility] facility, it is important to learn how to keep safe from harming yourself.    Recognize the warning signs:  · Abrupt changes in personality, positive or negative- including increase in energy   · Giving away possessions  · Change in eating patterns- significant weight changes-  positive or negative  · Change in sleeping patterns- unable to sleep or sleeping all the time   · Unwillingness or " inability to communicate  · Depression  · Unusual sadness, discouragement and loneliness  · Talk of wanting to die  · Neglect of personal appearance   · Rebelliousness- reckless behavior  · Withdrawal from people/activities they love  · Confusion- inability to concentrate     If you or a loved one observes any of these behaviors or has concerns about self-harm, here's what you can do:  · Talk about it- your feelings and reasons for harming yourself  · Remove any means that you might use to hurt yourself (examples: pills, rope, extension cords, firearm)  · Get professional help from the community (Mental Health, Substance Abuse, psychological counseling)  · Do not be alone:Call your Safe Contact- someone whom you trust who will be there for you.  · Call your local CRISIS HOTLINE 644-7436 or 206-086-4981  · Call your local Children's Mobile Crisis Response Team [place] (512) 515-8910 or www.SureDone  · Call the toll free National Suicide Prevention Hotlines   · National Suicide Prevention Lifeline 475-021-SJSI (4806)  · National Hope Line Network 800-SUICIDE (955-9456)

## 2018-08-29 NOTE — PROGRESS NOTES
Telemetry Summary    Rhythm ST  HR 100s-110s  Ectopy Frequent PVC/Bigem/Trigem, Rare Couplets  MI 0.16  QRS 0.08  QT 0.32

## 2018-08-29 NOTE — PROGRESS NOTES
Telemetry Shift Summary  Strip printed: 5750    Rhythm:  SR  Ectopy: bigeminy, trigeminy, couplets, frequent PVCs  Measurements  .18/.10/.34    *Episode of frequent ectopy with bigem and trigem while patient up walking. Dr. Cuellar notified, strip reviewed and placed on the chart.            Normal Values  Rhythm SR  HR Range    Measurements 0.12-0.20 / 0.06-0.10  / 0.30-0.52

## 2018-08-30 ENCOUNTER — PATIENT OUTREACH (OUTPATIENT)
Dept: HEALTH INFORMATION MANAGEMENT | Facility: OTHER | Age: 68
End: 2018-08-30
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
## 2018-09-12 ENCOUNTER — HOSPITAL ENCOUNTER (OUTPATIENT)
Dept: LAB | Facility: MEDICAL CENTER | Age: 68
End: 2018-09-12
Attending: NURSE PRACTITIONER
Payer: MEDICARE

## 2018-09-12 LAB
ALBUMIN SERPL BCP-MCNC: 3.5 G/DL (ref 3.2–4.9)
ALBUMIN/GLOB SERPL: 1.5 G/DL
ALP SERPL-CCNC: 62 U/L (ref 30–99)
ALT SERPL-CCNC: 18 U/L (ref 2–50)
ANION GAP SERPL CALC-SCNC: 10 MMOL/L (ref 0–11.9)
AST SERPL-CCNC: 16 U/L (ref 12–45)
BASOPHILS # BLD AUTO: 0.9 % (ref 0–1.8)
BASOPHILS # BLD: 0.12 K/UL (ref 0–0.12)
BILIRUB SERPL-MCNC: 0.6 MG/DL (ref 0.1–1.5)
BUN SERPL-MCNC: 17 MG/DL (ref 8–22)
CALCIUM SERPL-MCNC: 8.8 MG/DL (ref 8.5–10.5)
CHLORIDE SERPL-SCNC: 106 MMOL/L (ref 96–112)
CO2 SERPL-SCNC: 22 MMOL/L (ref 20–33)
CREAT SERPL-MCNC: 1.07 MG/DL (ref 0.5–1.4)
EOSINOPHIL # BLD AUTO: 0.1 K/UL (ref 0–0.51)
EOSINOPHIL NFR BLD: 0.7 % (ref 0–6.9)
ERYTHROCYTE [DISTWIDTH] IN BLOOD BY AUTOMATED COUNT: 44.7 FL (ref 35.9–50)
FERRITIN SERPL-MCNC: 264.7 NG/ML (ref 22–322)
GLOBULIN SER CALC-MCNC: 2.4 G/DL (ref 1.9–3.5)
GLUCOSE SERPL-MCNC: 114 MG/DL (ref 65–99)
HCT VFR BLD AUTO: 34.5 % (ref 42–52)
HGB BLD-MCNC: 11 G/DL (ref 14–18)
IMM GRANULOCYTES # BLD AUTO: 0.1 K/UL (ref 0–0.11)
IMM GRANULOCYTES NFR BLD AUTO: 0.7 % (ref 0–0.9)
IRON SATN MFR SERPL: ABNORMAL % (ref 15–55)
IRON SERPL-MCNC: <10 UG/DL (ref 50–180)
LYMPHOCYTES # BLD AUTO: 1.4 K/UL (ref 1–4.8)
LYMPHOCYTES NFR BLD: 10.2 % (ref 22–41)
MCH RBC QN AUTO: 28.9 PG (ref 27–33)
MCHC RBC AUTO-ENTMCNC: 31.9 G/DL (ref 33.7–35.3)
MCV RBC AUTO: 90.8 FL (ref 81.4–97.8)
MONOCYTES # BLD AUTO: 0.79 K/UL (ref 0–0.85)
MONOCYTES NFR BLD AUTO: 5.7 % (ref 0–13.4)
NEUTROPHILS # BLD AUTO: 11.27 K/UL (ref 1.82–7.42)
NEUTROPHILS NFR BLD: 81.8 % (ref 44–72)
NRBC # BLD AUTO: 0 K/UL
NRBC BLD-RTO: 0 /100 WBC
PLATELET # BLD AUTO: 367 K/UL (ref 164–446)
PMV BLD AUTO: 9.1 FL (ref 9–12.9)
POTASSIUM SERPL-SCNC: 3.8 MMOL/L (ref 3.6–5.5)
PROT SERPL-MCNC: 5.9 G/DL (ref 6–8.2)
RBC # BLD AUTO: 3.8 M/UL (ref 4.7–6.1)
SODIUM SERPL-SCNC: 138 MMOL/L (ref 135–145)
TIBC SERPL-MCNC: 249 UG/DL (ref 250–450)
WBC # BLD AUTO: 13.8 K/UL (ref 4.8–10.8)

## 2018-09-12 PROCEDURE — 87086 URINE CULTURE/COLONY COUNT: CPT

## 2018-09-12 PROCEDURE — 87077 CULTURE AEROBIC IDENTIFY: CPT

## 2018-09-12 PROCEDURE — 36415 COLL VENOUS BLD VENIPUNCTURE: CPT

## 2018-09-12 PROCEDURE — 85025 COMPLETE CBC W/AUTO DIFF WBC: CPT

## 2018-09-12 PROCEDURE — 83550 IRON BINDING TEST: CPT

## 2018-09-12 PROCEDURE — 83540 ASSAY OF IRON: CPT

## 2018-09-12 PROCEDURE — 80053 COMPREHEN METABOLIC PANEL: CPT

## 2018-09-12 PROCEDURE — 87186 SC STD MICRODIL/AGAR DIL: CPT

## 2018-09-12 PROCEDURE — 82728 ASSAY OF FERRITIN: CPT

## 2018-09-14 LAB
BACTERIA UR CULT: ABNORMAL
BACTERIA UR CULT: ABNORMAL
SIGNIFICANT IND 70042: ABNORMAL
SITE SITE: ABNORMAL
SOURCE SOURCE: ABNORMAL

## 2018-10-01 ENCOUNTER — PATIENT OUTREACH (OUTPATIENT)
Dept: HEALTH INFORMATION MANAGEMENT | Facility: OTHER | Age: 68
End: 2018-10-01
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
## 2018-11-14 ENCOUNTER — HOSPITAL ENCOUNTER (OUTPATIENT)
Dept: LAB | Facility: MEDICAL CENTER | Age: 68
End: 2018-11-14
Attending: PHYSICIAN ASSISTANT
Payer: MEDICARE

## 2018-11-14 LAB
BASOPHILS # BLD AUTO: 1.6 % (ref 0–1.8)
BASOPHILS # BLD: 0.12 K/UL (ref 0–0.12)
EOSINOPHIL # BLD AUTO: 0.1 K/UL (ref 0–0.51)
EOSINOPHIL NFR BLD: 1.4 % (ref 0–6.9)
ERYTHROCYTE [DISTWIDTH] IN BLOOD BY AUTOMATED COUNT: 41.1 FL (ref 35.9–50)
HCT VFR BLD AUTO: 48.7 % (ref 42–52)
HGB BLD-MCNC: 15.7 G/DL (ref 14–18)
IMM GRANULOCYTES # BLD AUTO: 0.02 K/UL (ref 0–0.11)
IMM GRANULOCYTES NFR BLD AUTO: 0.3 % (ref 0–0.9)
LYMPHOCYTES # BLD AUTO: 1.84 K/UL (ref 1–4.8)
LYMPHOCYTES NFR BLD: 25.2 % (ref 22–41)
MCH RBC QN AUTO: 28.1 PG (ref 27–33)
MCHC RBC AUTO-ENTMCNC: 32.2 G/DL (ref 33.7–35.3)
MCV RBC AUTO: 87.1 FL (ref 81.4–97.8)
MONOCYTES # BLD AUTO: 0.66 K/UL (ref 0–0.85)
MONOCYTES NFR BLD AUTO: 9.1 % (ref 0–13.4)
NEUTROPHILS # BLD AUTO: 4.55 K/UL (ref 1.82–7.42)
NEUTROPHILS NFR BLD: 62.4 % (ref 44–72)
NRBC # BLD AUTO: 0 K/UL
NRBC BLD-RTO: 0 /100 WBC
PLATELET # BLD AUTO: 342 K/UL (ref 164–446)
PMV BLD AUTO: 9.6 FL (ref 9–12.9)
RBC # BLD AUTO: 5.59 M/UL (ref 4.7–6.1)
WBC # BLD AUTO: 7.3 K/UL (ref 4.8–10.8)

## 2018-11-14 PROCEDURE — 83550 IRON BINDING TEST: CPT

## 2018-11-14 PROCEDURE — 85025 COMPLETE CBC W/AUTO DIFF WBC: CPT

## 2018-11-14 PROCEDURE — 83540 ASSAY OF IRON: CPT

## 2018-11-14 PROCEDURE — 36415 COLL VENOUS BLD VENIPUNCTURE: CPT

## 2018-11-15 LAB
IRON SATN MFR SERPL: 32 % (ref 15–55)
IRON SERPL-MCNC: 111 UG/DL (ref 50–180)
TIBC SERPL-MCNC: 342 UG/DL (ref 250–450)

## 2018-12-31 NOTE — ADDENDUM NOTE
Encounter addended by: Fanta Collier R.N. on: 12/31/2018  9:30 AM<BR>    Actions taken: Flowsheet accepted

## 2019-03-20 ENCOUNTER — HOSPITAL ENCOUNTER (OUTPATIENT)
Dept: LAB | Facility: MEDICAL CENTER | Age: 69
End: 2019-03-20
Attending: FAMILY MEDICINE
Payer: MEDICARE

## 2019-03-20 LAB
ALBUMIN SERPL BCP-MCNC: 4.7 G/DL (ref 3.2–4.9)
ALBUMIN/GLOB SERPL: 3.1 G/DL
ALP SERPL-CCNC: 75 U/L (ref 30–99)
ALT SERPL-CCNC: 18 U/L (ref 2–50)
ANION GAP SERPL CALC-SCNC: 9 MMOL/L (ref 0–11.9)
AST SERPL-CCNC: 15 U/L (ref 12–45)
BASOPHILS # BLD AUTO: 1.4 % (ref 0–1.8)
BASOPHILS # BLD: 0.1 K/UL (ref 0–0.12)
BILIRUB SERPL-MCNC: 0.4 MG/DL (ref 0.1–1.5)
BUN SERPL-MCNC: 16 MG/DL (ref 8–22)
CALCIUM SERPL-MCNC: 9 MG/DL (ref 8.5–10.5)
CHLORIDE SERPL-SCNC: 103 MMOL/L (ref 96–112)
CO2 SERPL-SCNC: 25 MMOL/L (ref 20–33)
CREAT SERPL-MCNC: 0.86 MG/DL (ref 0.5–1.4)
EOSINOPHIL # BLD AUTO: 0.14 K/UL (ref 0–0.51)
EOSINOPHIL NFR BLD: 1.9 % (ref 0–6.9)
ERYTHROCYTE [DISTWIDTH] IN BLOOD BY AUTOMATED COUNT: 44.6 FL (ref 35.9–50)
FERRITIN SERPL-MCNC: 143.5 NG/ML (ref 22–322)
GLOBULIN SER CALC-MCNC: 1.5 G/DL (ref 1.9–3.5)
GLUCOSE SERPL-MCNC: 95 MG/DL (ref 65–99)
HCT VFR BLD AUTO: 45.7 % (ref 42–52)
HGB BLD-MCNC: 14.9 G/DL (ref 14–18)
IMM GRANULOCYTES # BLD AUTO: 0.03 K/UL (ref 0–0.11)
IMM GRANULOCYTES NFR BLD AUTO: 0.4 % (ref 0–0.9)
IRON SATN MFR SERPL: 35 % (ref 15–55)
IRON SERPL-MCNC: 111 UG/DL (ref 50–180)
LYMPHOCYTES # BLD AUTO: 2.04 K/UL (ref 1–4.8)
LYMPHOCYTES NFR BLD: 28.4 % (ref 22–41)
MCH RBC QN AUTO: 29.6 PG (ref 27–33)
MCHC RBC AUTO-ENTMCNC: 32.6 G/DL (ref 33.7–35.3)
MCV RBC AUTO: 90.7 FL (ref 81.4–97.8)
MONOCYTES # BLD AUTO: 0.77 K/UL (ref 0–0.85)
MONOCYTES NFR BLD AUTO: 10.7 % (ref 0–13.4)
NEUTROPHILS # BLD AUTO: 4.1 K/UL (ref 1.82–7.42)
NEUTROPHILS NFR BLD: 57.2 % (ref 44–72)
NRBC # BLD AUTO: 0 K/UL
NRBC BLD-RTO: 0 /100 WBC
PLATELET # BLD AUTO: 362 K/UL (ref 164–446)
PMV BLD AUTO: 9.5 FL (ref 9–12.9)
POTASSIUM SERPL-SCNC: 4.5 MMOL/L (ref 3.6–5.5)
PROT SERPL-MCNC: 6.2 G/DL (ref 6–8.2)
RBC # BLD AUTO: 5.04 M/UL (ref 4.7–6.1)
SODIUM SERPL-SCNC: 137 MMOL/L (ref 135–145)
TIBC SERPL-MCNC: 318 UG/DL (ref 250–450)
WBC # BLD AUTO: 7.2 K/UL (ref 4.8–10.8)

## 2019-03-20 PROCEDURE — 83550 IRON BINDING TEST: CPT

## 2019-03-20 PROCEDURE — 83540 ASSAY OF IRON: CPT

## 2019-03-20 PROCEDURE — 36415 COLL VENOUS BLD VENIPUNCTURE: CPT

## 2019-03-20 PROCEDURE — 82728 ASSAY OF FERRITIN: CPT

## 2019-03-20 PROCEDURE — 85025 COMPLETE CBC W/AUTO DIFF WBC: CPT

## 2019-03-20 PROCEDURE — 80053 COMPREHEN METABOLIC PANEL: CPT

## 2019-04-16 ENCOUNTER — HOSPITAL ENCOUNTER (EMERGENCY)
Facility: MEDICAL CENTER | Age: 69
End: 2019-04-16
Attending: EMERGENCY MEDICINE
Payer: MEDICARE

## 2019-04-16 VITALS
DIASTOLIC BLOOD PRESSURE: 90 MMHG | HEIGHT: 68 IN | RESPIRATION RATE: 17 BRPM | BODY MASS INDEX: 28.04 KG/M2 | HEART RATE: 90 BPM | WEIGHT: 185 LBS | SYSTOLIC BLOOD PRESSURE: 160 MMHG | TEMPERATURE: 98.1 F
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
## 2019-04-16 DIAGNOSIS — S61.213A LACERATION OF LEFT MIDDLE FINGER WITHOUT FOREIGN BODY WITHOUT DAMAGE TO NAIL, INITIAL ENCOUNTER: ICD-10-CM

## 2019-04-16 PROCEDURE — 700101 HCHG RX REV CODE 250: Performed by: EMERGENCY MEDICINE

## 2019-04-16 PROCEDURE — 304217 HCHG IRRIGATION SYSTEM

## 2019-04-16 PROCEDURE — 303485 HCHG DRESSING MEDIUM

## 2019-04-16 PROCEDURE — 99284 EMERGENCY DEPT VISIT MOD MDM: CPT

## 2019-04-16 PROCEDURE — 303747 HCHG EXTRA SUTURE

## 2019-04-16 PROCEDURE — 304999 HCHG REPAIR-SIMPLE/INTERMED LEVEL 1

## 2019-04-16 PROCEDURE — A6403 STERILE GAUZE>16 <= 48 SQ IN: HCPCS

## 2019-04-16 PROCEDURE — 700111 HCHG RX REV CODE 636 W/ 250 OVERRIDE (IP): Performed by: EMERGENCY MEDICINE

## 2019-04-16 PROCEDURE — 90471 IMMUNIZATION ADMIN: CPT

## 2019-04-16 PROCEDURE — 90715 TDAP VACCINE 7 YRS/> IM: CPT | Performed by: EMERGENCY MEDICINE

## 2019-04-16 RX ORDER — LIDOCAINE HYDROCHLORIDE 10 MG/ML
20 INJECTION, SOLUTION INFILTRATION; PERINEURAL ONCE
Status: COMPLETED | OUTPATIENT
Start: 2019-04-16 | End: 2019-04-16

## 2019-04-16 RX ORDER — AMOXICILLIN AND CLAVULANATE POTASSIUM 875; 125 MG/1; MG/1
1 TABLET, FILM COATED ORAL 2 TIMES DAILY
Qty: 14 TAB | Refills: 0 | Status: SHIPPED | OUTPATIENT
Start: 2019-04-16 | End: 2019-04-23

## 2019-04-16 RX ADMIN — CLOSTRIDIUM TETANI TOXOID ANTIGEN (FORMALDEHYDE INACTIVATED), CORYNEBACTERIUM DIPHTHERIAE TOXOID ANTIGEN (FORMALDEHYDE INACTIVATED), BORDETELLA PERTUSSIS TOXOID ANTIGEN (GLUTARALDEHYDE INACTIVATED), BORDETELLA PERTUSSIS FILAMENTOUS HEMAGGLUTININ ANTIGEN (FORMALDEHYDE INACTIVATED), BORDETELLA PERTUSSIS PERTACTIN ANTIGEN, AND BORDETELLA PERTUSSIS FIMBRIAE 2/3 ANTIGEN 0.5 ML: 5; 2; 2.5; 5; 3; 5 INJECTION, SUSPENSION INTRAMUSCULAR at 16:14

## 2019-04-16 RX ADMIN — LIDOCAINE HYDROCHLORIDE 20 ML: 10 INJECTION, SOLUTION INFILTRATION; PERINEURAL at 15:41
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
## 2019-04-16 NOTE — ED NOTES
..Pt verbalizes understanding of dc instructions provided. rx sent to pharmacy Pt states that all questions have been answered and they feel comfortable with discharge instructions provided. Pt has dc paperwork in hand. Pt has all belongings in position. Pt to lobby w/o incident.
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
## 2019-05-29 ENCOUNTER — OFFICE VISIT (OUTPATIENT)
Dept: URGENT CARE | Facility: CLINIC | Age: 69
End: 2019-05-29
Payer: MEDICARE

## 2019-05-29 VITALS
HEART RATE: 75 BPM | RESPIRATION RATE: 15 BRPM | DIASTOLIC BLOOD PRESSURE: 80 MMHG | WEIGHT: 185 LBS | SYSTOLIC BLOOD PRESSURE: 132 MMHG | HEIGHT: 68 IN | TEMPERATURE: 98.2 F | BODY MASS INDEX: 28.04 KG/M2 | OXYGEN SATURATION: 96 %

## 2019-05-29 DIAGNOSIS — H53.8 BLURRY VISION, RIGHT EYE: ICD-10-CM

## 2019-05-29 PROCEDURE — 99213 OFFICE O/P EST LOW 20 MIN: CPT | Performed by: PHYSICIAN ASSISTANT

## 2019-05-29 ASSESSMENT — ENCOUNTER SYMPTOMS
NAUSEA: 0
FOREIGN BODY SENSATION: 0
DOUBLE VISION: 0
EYE ITCHING: 0
FEVER: 0
EYE REDNESS: 0
EYE DISCHARGE: 0
MYALGIAS: 0
PHOTOPHOBIA: 0
EYE PAIN: 0
BLURRED VISION: 1

## 2019-05-29 ASSESSMENT — VISUAL ACUITY
OD_CC: 20/50
OS_CC: 20/50

## 2019-05-29 NOTE — PROGRESS NOTES
Subjective:      Gunnar Mcneil is a 68 y.o. male who presents with Eye Problem (right eye is blurry and having troble seeing out of it started yesterday)            Eye Problem    The right eye is affected. This is a new problem. The current episode started yesterday. The problem occurs constantly. The problem has been unchanged. There was no injury mechanism. The pain is at a severity of 0/10. The patient is experiencing no pain. There is no known exposure to pink eye. He does not wear contacts. Associated symptoms include blurred vision. Pertinent negatives include no eye discharge, double vision, eye redness, fever, foreign body sensation, itching, nausea, photophobia or recent URI. He has tried nothing for the symptoms. The treatment provided no relief.     Past Medical History:   Diagnosis Date   • Anxiety    • Depression    • Hypertension    • Low back pain      Current Outpatient Prescriptions on File Prior to Visit   Medication Sig Dispense Refill   • finasteride (PROSCAR) 5 MG Tab Take 1 Tab by mouth every day. 30 Tab 12   • tamsulosin (FLOMAX) 0.4 MG capsule Take 1 Cap by mouth 2 Times a Day. 60 Cap 12   • multivitamin (THERAGRAN) Tab Take 1 Tab by mouth every day.     • POTASSIUM PO Take 1 Tab by mouth every day.     • Saw Palmetto, Serenoa repens, (SAW PALMETTO PO) Take 1 Tab by mouth every day.     • propranolol (INDERAL) 20 MG Tab Take 20 mg by mouth every day.     • cyclobenzaprine (FLEXERIL) 5 MG tablet Take 1 Tab by mouth 3 times a day as needed. 30 Tab 0   • busPIRone (BUSPAR) 10 MG Tab Take 10 mg by mouth 3 times a day.     • buPROPion (WELLBUTRIN XL) 150 MG XL tablet Take 150 mg by mouth every morning.     • amlodipine-benazepril (LOTREL) 5-20 MG per capsule Take 1 Cap by mouth every day. 30 Cap 6   • escitalopram (LEXAPRO) 20 MG tablet Take 1 Tab by mouth every day. 30 Tab 6   • diphenhydrAMINE (BENADRYL) 25 MG capsule Take 1 Cap by mouth 1 time daily as needed (allergy symptoms).       No  "current facility-administered medications on file prior to visit.      Bactrim [sulfamethoxazole w-trimethoprim]  Family History   Problem Relation Age of Onset   • Cancer Mother 65        breast   • Hypertension Mother    • Cancer Maternal Grandmother         lung     Social History     Social History   • Marital status: Single     Spouse name: N/A   • Number of children: N/A   • Years of education: N/A     Occupational History   • Not on file.     Social History Main Topics   • Smoking status: Never Smoker   • Smokeless tobacco: Never Used   • Alcohol use No   • Drug use: No   • Sexual activity: Not on file     Other Topics Concern   • Not on file     Social History Narrative   • No narrative on file       Review of Systems   Constitutional: Negative for fever.   Eyes: Positive for blurred vision. Negative for double vision, photophobia, pain, discharge, redness and itching.   Cardiovascular: Negative for chest pain.   Gastrointestinal: Negative for nausea.   Musculoskeletal: Negative for myalgias.          Objective:     /80   Pulse 75   Temp 36.8 °C (98.2 °F) (Temporal)   Resp 15   Ht 1.727 m (5' 8\")   Wt 83.9 kg (185 lb)   SpO2 96%   BMI 28.13 kg/m²      Physical Exam       Gen.: Patient is A and O ×3, no acute distress, well-nourished well-hydrated  Vitals: Are listed and unremarkable.  Snellen eye chart:  HEENT: Heads normocephalic, atraumatic, PERRLA, extraocular movements intact, TMs and oropharynx clear  Neck: Soft supple without cervical lymphadenopathy  Cardiovascular: Regular rate and rhythm normal S1 and S2. No murmurs, rubs or gallops  Lungs are clear to auscultation bilaterally. no wheezes rales or rhonchi  Abdomen is soft, nontender, nondistended with good bowel sounds, no hepatosplenomegaly  Skin: Is well perfused without evidence of rash or lesions  Neurological:  cranial nerves II through XII were assessed and intact.  No facial paralysis noted.  Musculoskeletal: Full range of " motion, 5 out of 5 strength against resistance  Neurovascularly: Intact with a 2 second cap refill, good distal pulses       Assessment/Plan:     1. Blurry vision, right eye  Normal neurological examination.  No other symptoms at all.  Will refer to ophthalmology and if symptoms persist, worsen or change in the next 12 to 24 hours please go to the emergency room  - REFERRAL TO OPHTHALMOLOGY

## 2019-10-08 ENCOUNTER — HOSPITAL ENCOUNTER (OUTPATIENT)
Dept: LAB | Facility: MEDICAL CENTER | Age: 69
End: 2019-10-08
Attending: PHYSICIAN ASSISTANT
Payer: MEDICARE

## 2019-10-08 LAB
ALBUMIN SERPL BCP-MCNC: 4.3 G/DL (ref 3.2–4.9)
ALBUMIN/GLOB SERPL: 2 G/DL
ALP SERPL-CCNC: 75 U/L (ref 30–99)
ALT SERPL-CCNC: 17 U/L (ref 2–50)
ANION GAP SERPL CALC-SCNC: 7 MMOL/L (ref 0–11.9)
AST SERPL-CCNC: 15 U/L (ref 12–45)
BILIRUB SERPL-MCNC: 0.5 MG/DL (ref 0.1–1.5)
BUN SERPL-MCNC: 11 MG/DL (ref 8–22)
CALCIUM SERPL-MCNC: 8.6 MG/DL (ref 8.5–10.5)
CHLORIDE SERPL-SCNC: 104 MMOL/L (ref 96–112)
CHOLEST SERPL-MCNC: 162 MG/DL (ref 100–199)
CO2 SERPL-SCNC: 26 MMOL/L (ref 20–33)
CREAT SERPL-MCNC: 0.76 MG/DL (ref 0.5–1.4)
EST. AVERAGE GLUCOSE BLD GHB EST-MCNC: 114 MG/DL
GLOBULIN SER CALC-MCNC: 2.1 G/DL (ref 1.9–3.5)
GLUCOSE SERPL-MCNC: 96 MG/DL (ref 65–99)
HBA1C MFR BLD: 5.6 % (ref 0–5.6)
HCV AB SER QL: NEGATIVE
HDLC SERPL-MCNC: 49 MG/DL
LDLC SERPL CALC-MCNC: 93 MG/DL
POTASSIUM SERPL-SCNC: 4.1 MMOL/L (ref 3.6–5.5)
PROT SERPL-MCNC: 6.4 G/DL (ref 6–8.2)
PSA SERPL-MCNC: 1.74 NG/ML (ref 0–4)
SODIUM SERPL-SCNC: 137 MMOL/L (ref 135–145)
TRIGL SERPL-MCNC: 98 MG/DL (ref 0–149)

## 2019-10-08 PROCEDURE — 84153 ASSAY OF PSA TOTAL: CPT

## 2019-10-08 PROCEDURE — 86803 HEPATITIS C AB TEST: CPT

## 2019-10-08 PROCEDURE — 80053 COMPREHEN METABOLIC PANEL: CPT

## 2019-10-08 PROCEDURE — 36415 COLL VENOUS BLD VENIPUNCTURE: CPT

## 2019-10-08 PROCEDURE — 83036 HEMOGLOBIN GLYCOSYLATED A1C: CPT

## 2019-10-08 PROCEDURE — 80061 LIPID PANEL: CPT

## 2021-03-03 DIAGNOSIS — Z23 NEED FOR VACCINATION: ICD-10-CM

## 2021-03-31 ENCOUNTER — HOSPITAL ENCOUNTER (OUTPATIENT)
Dept: LAB | Facility: MEDICAL CENTER | Age: 71
End: 2021-03-31
Attending: FAMILY MEDICINE
Payer: MEDICARE

## 2021-03-31 LAB
ALBUMIN SERPL BCP-MCNC: 4.4 G/DL (ref 3.2–4.9)
ALBUMIN/GLOB SERPL: 1.8 G/DL
ALP SERPL-CCNC: 95 U/L (ref 30–99)
ALT SERPL-CCNC: 20 U/L (ref 2–50)
ANION GAP SERPL CALC-SCNC: 5 MMOL/L (ref 7–16)
APPEARANCE UR: CLEAR
AST SERPL-CCNC: 12 U/L (ref 12–45)
BASOPHILS # BLD AUTO: 1.5 % (ref 0–1.8)
BASOPHILS # BLD: 0.13 K/UL (ref 0–0.12)
BILIRUB SERPL-MCNC: 0.6 MG/DL (ref 0.1–1.5)
BILIRUB UR QL STRIP.AUTO: NEGATIVE
BUN SERPL-MCNC: 18 MG/DL (ref 8–22)
CALCIUM SERPL-MCNC: 9.2 MG/DL (ref 8.5–10.5)
CHLORIDE SERPL-SCNC: 104 MMOL/L (ref 96–112)
CHOLEST SERPL-MCNC: 165 MG/DL (ref 100–199)
CO2 SERPL-SCNC: 28 MMOL/L (ref 20–33)
COLOR UR: YELLOW
CREAT SERPL-MCNC: 0.82 MG/DL (ref 0.5–1.4)
EOSINOPHIL # BLD AUTO: 0.26 K/UL (ref 0–0.51)
EOSINOPHIL NFR BLD: 3.1 % (ref 0–6.9)
ERYTHROCYTE [DISTWIDTH] IN BLOOD BY AUTOMATED COUNT: 40.5 FL (ref 35.9–50)
EST. AVERAGE GLUCOSE BLD GHB EST-MCNC: 131 MG/DL
FASTING STATUS PATIENT QL REPORTED: NORMAL
GLOBULIN SER CALC-MCNC: 2.4 G/DL (ref 1.9–3.5)
GLUCOSE SERPL-MCNC: 101 MG/DL (ref 65–99)
GLUCOSE UR STRIP.AUTO-MCNC: NEGATIVE MG/DL
HBA1C MFR BLD: 6.2 % (ref 4–5.6)
HCT VFR BLD AUTO: 49.2 % (ref 42–52)
HDLC SERPL-MCNC: 51 MG/DL
HGB BLD-MCNC: 16.2 G/DL (ref 14–18)
IMM GRANULOCYTES # BLD AUTO: 0.03 K/UL (ref 0–0.11)
IMM GRANULOCYTES NFR BLD AUTO: 0.4 % (ref 0–0.9)
KETONES UR STRIP.AUTO-MCNC: NEGATIVE MG/DL
LDLC SERPL CALC-MCNC: 103 MG/DL
LEUKOCYTE ESTERASE UR QL STRIP.AUTO: NEGATIVE
LYMPHOCYTES # BLD AUTO: 2.05 K/UL (ref 1–4.8)
LYMPHOCYTES NFR BLD: 24.2 % (ref 22–41)
MCH RBC QN AUTO: 28.6 PG (ref 27–33)
MCHC RBC AUTO-ENTMCNC: 32.9 G/DL (ref 33.7–35.3)
MCV RBC AUTO: 86.8 FL (ref 81.4–97.8)
MICRO URNS: NORMAL
MONOCYTES # BLD AUTO: 0.67 K/UL (ref 0–0.85)
MONOCYTES NFR BLD AUTO: 7.9 % (ref 0–13.4)
NEUTROPHILS # BLD AUTO: 5.32 K/UL (ref 1.82–7.42)
NEUTROPHILS NFR BLD: 62.9 % (ref 44–72)
NITRITE UR QL STRIP.AUTO: NEGATIVE
NRBC # BLD AUTO: 0 K/UL
NRBC BLD-RTO: 0 /100 WBC
PH UR STRIP.AUTO: 6.5 [PH] (ref 5–8)
PLATELET # BLD AUTO: 317 K/UL (ref 164–446)
PMV BLD AUTO: 9.8 FL (ref 9–12.9)
POTASSIUM SERPL-SCNC: 4.5 MMOL/L (ref 3.6–5.5)
PROT SERPL-MCNC: 6.8 G/DL (ref 6–8.2)
PROT UR QL STRIP: NEGATIVE MG/DL
PSA SERPL-MCNC: 1.08 NG/ML (ref 0–4)
RBC # BLD AUTO: 5.67 M/UL (ref 4.7–6.1)
RBC UR QL AUTO: NEGATIVE
SODIUM SERPL-SCNC: 137 MMOL/L (ref 135–145)
SP GR UR STRIP.AUTO: 1.01
TRIGL SERPL-MCNC: 56 MG/DL (ref 0–149)
UROBILINOGEN UR STRIP.AUTO-MCNC: 0.2 MG/DL
WBC # BLD AUTO: 8.5 K/UL (ref 4.8–10.8)

## 2021-03-31 PROCEDURE — 84153 ASSAY OF PSA TOTAL: CPT

## 2021-03-31 PROCEDURE — 83036 HEMOGLOBIN GLYCOSYLATED A1C: CPT

## 2021-03-31 PROCEDURE — 85025 COMPLETE CBC W/AUTO DIFF WBC: CPT

## 2021-03-31 PROCEDURE — 81003 URINALYSIS AUTO W/O SCOPE: CPT

## 2021-03-31 PROCEDURE — 80061 LIPID PANEL: CPT

## 2021-03-31 PROCEDURE — 36415 COLL VENOUS BLD VENIPUNCTURE: CPT

## 2021-03-31 PROCEDURE — 80053 COMPREHEN METABOLIC PANEL: CPT

## 2021-06-28 ENCOUNTER — PATIENT MESSAGE (OUTPATIENT)
Dept: HEALTH INFORMATION MANAGEMENT | Facility: OTHER | Age: 71
End: 2021-06-28

## 2021-07-14 ENCOUNTER — OFFICE VISIT (OUTPATIENT)
Dept: URGENT CARE | Facility: CLINIC | Age: 71
End: 2021-07-14
Payer: MEDICARE

## 2021-07-14 VITALS
SYSTOLIC BLOOD PRESSURE: 128 MMHG | DIASTOLIC BLOOD PRESSURE: 84 MMHG | WEIGHT: 200 LBS | TEMPERATURE: 97.6 F | OXYGEN SATURATION: 96 % | BODY MASS INDEX: 30.31 KG/M2 | HEIGHT: 68 IN | HEART RATE: 102 BPM | RESPIRATION RATE: 14 BRPM

## 2021-07-14 DIAGNOSIS — M54.50 CHRONIC BILATERAL LOW BACK PAIN WITHOUT SCIATICA: ICD-10-CM

## 2021-07-14 DIAGNOSIS — M54.50 ACUTE BILATERAL LOW BACK PAIN WITHOUT SCIATICA: ICD-10-CM

## 2021-07-14 DIAGNOSIS — G89.29 CHRONIC BILATERAL LOW BACK PAIN WITHOUT SCIATICA: ICD-10-CM

## 2021-07-14 DIAGNOSIS — M62.838 MUSCLE SPASM: ICD-10-CM

## 2021-07-14 PROCEDURE — 99214 OFFICE O/P EST MOD 30 MIN: CPT | Performed by: NURSE PRACTITIONER

## 2021-07-14 RX ORDER — NAPROXEN 500 MG/1
500 TABLET ORAL 2 TIMES DAILY WITH MEALS
Qty: 30 TABLET | Refills: 0 | Status: SHIPPED | OUTPATIENT
Start: 2021-07-14 | End: 2021-07-29

## 2021-07-14 RX ORDER — METHYLPREDNISOLONE 4 MG/1
TABLET ORAL
Qty: 21 TABLET | Refills: 0 | Status: SHIPPED | OUTPATIENT
Start: 2021-07-14 | End: 2023-09-10

## 2021-07-14 RX ORDER — TIZANIDINE 4 MG/1
4 TABLET ORAL EVERY 6 HOURS PRN
Qty: 30 TABLET | Refills: 3 | Status: SHIPPED
Start: 2021-07-14 | End: 2021-07-16

## 2021-07-14 ASSESSMENT — ENCOUNTER SYMPTOMS
CARDIOVASCULAR NEGATIVE: 1
GASTROINTESTINAL NEGATIVE: 1
RESPIRATORY NEGATIVE: 1
EYES NEGATIVE: 1
PSYCHIATRIC NEGATIVE: 1
NEUROLOGICAL NEGATIVE: 1
CONSTITUTIONAL NEGATIVE: 1
BACK PAIN: 1

## 2021-07-14 ASSESSMENT — FIBROSIS 4 INDEX: FIB4 SCORE: 0.58

## 2021-07-14 NOTE — PROGRESS NOTES
Subjective:   Gunnar Mcneil is a 70 y.o. male who presents for Low Back Pain (x 3 days after reaching down feeding his cat and when he tried to get up he had a back spasm. )       Back Pain  This is a new problem. The current episode started in the past 7 days. The problem occurs constantly. The problem is unchanged. The pain is present in the lumbar spine. The pain does not radiate. The pain is moderate. The symptoms are aggravated by bending and position. (None) Risk factors: none. He has tried analgesics, bed rest, heat, ice, muscle relaxant and NSAIDs for the symptoms. The treatment provided no relief.     Pt presents for evaluation of a new problem, reports bending over to feed his cat, when he was trying to get up and had a sharp muscle spasm involving his midline spine.  Patient states that he has had similar occurrence approximately 20 years ago, as well as having chronic back pain.  Patient has tried Flexeril in the past without relief.  Patient has not been able to sleep due to pain, and has been walking with a 1 point cane for stability.    Review of Systems   Constitutional: Negative.    HENT: Negative.    Eyes: Negative.    Respiratory: Negative.    Cardiovascular: Negative.    Gastrointestinal: Negative.    Genitourinary: Negative.    Musculoskeletal: Positive for back pain.   Skin: Negative.    Neurological: Negative.    Psychiatric/Behavioral: Negative.    All other systems reviewed and are negative.      MEDS:   Current Outpatient Medications:   •  tizanidine (ZANAFLEX) 4 MG Tab, Take 1 tablet by mouth every 6 hours as needed., Disp: 30 tablet, Rfl: 3  •  methylPREDNISolone (MEDROL DOSEPAK) 4 MG Tablet Therapy Pack, Follow schedule on package instructions., Disp: 21 tablet, Rfl: 0  •  naproxen (NAPROSYN) 500 MG Tab, Take 1 tablet by mouth 2 times a day with meals for 30 doses., Disp: 30 tablet, Rfl: 0  •  finasteride (PROSCAR) 5 MG Tab, Take 1 Tab by mouth every day., Disp: 30 Tab, Rfl: 12  •   "tamsulosin (FLOMAX) 0.4 MG capsule, Take 1 Cap by mouth 2 Times a Day., Disp: 60 Cap, Rfl: 12  •  multivitamin (THERAGRAN) Tab, Take 1 Tab by mouth every day., Disp: , Rfl:   •  propranolol (INDERAL) 20 MG Tab, Take 20 mg by mouth every day., Disp: , Rfl:   •  busPIRone (BUSPAR) 10 MG Tab, Take 10 mg by mouth 3 times a day., Disp: , Rfl:   •  buPROPion (WELLBUTRIN XL) 150 MG XL tablet, Take 150 mg by mouth every morning., Disp: , Rfl:   •  amlodipine-benazepril (LOTREL) 5-20 MG per capsule, Take 1 Cap by mouth every day., Disp: 30 Cap, Rfl: 6  •  escitalopram (LEXAPRO) 20 MG tablet, Take 1 Tab by mouth every day., Disp: 30 Tab, Rfl: 6  •  diphenhydrAMINE (BENADRYL) 25 MG capsule, Take 1 Cap by mouth 1 time daily as needed (allergy symptoms)., Disp: , Rfl:   ALLERGIES:   Allergies   Allergen Reactions   • Bactrim [Sulfamethoxazole W-Trimethoprim]        Patient's PMH, SocHx, SurgHx, FamHx, Drug allergies and medications were reviewed.     Objective:   /84   Pulse (!) 102   Temp 36.4 °C (97.6 °F) (Temporal)   Resp 14   Ht 1.727 m (5' 8\")   Wt 90.7 kg (200 lb)   SpO2 96%   BMI 30.41 kg/m²     Physical Exam  Vitals and nursing note reviewed.   Constitutional:       General: He is awake.      Appearance: Normal appearance. He is well-developed.   HENT:      Head: Normocephalic and atraumatic.      Right Ear: External ear normal.      Left Ear: External ear normal.      Nose: Nose normal.      Mouth/Throat:      Lips: Pink.      Mouth: Mucous membranes are moist.      Pharynx: Oropharynx is clear.   Eyes:      General: Lids are normal.      Extraocular Movements: Extraocular movements intact.      Conjunctiva/sclera: Conjunctivae normal.   Cardiovascular:      Rate and Rhythm: Normal rate and regular rhythm.      Comments: Patient is not tachycardic at time of visit.  Pulmonary:      Effort: Pulmonary effort is normal.   Musculoskeletal:      Cervical back: Normal range of motion.      Lumbar back: Spasms and " tenderness present. Decreased range of motion.        Back:    Skin:     General: Skin is warm and dry.   Neurological:      Mental Status: He is alert and oriented to person, place, and time.      Comments: Patient is in 1 point cane, states that he generally does not use this but has needed for stability since injury.   Psychiatric:         Mood and Affect: Mood normal.         Behavior: Behavior normal. Behavior is cooperative.         Thought Content: Thought content normal.         Judgment: Judgment normal.         Assessment/Plan:   Assessment    1. Acute bilateral low back pain without sciatica  - tizanidine (ZANAFLEX) 4 MG Tab; Take 1 tablet by mouth every 6 hours as needed.  Dispense: 30 tablet; Refill: 3  - methylPREDNISolone (MEDROL DOSEPAK) 4 MG Tablet Therapy Pack; Follow schedule on package instructions.  Dispense: 21 tablet; Refill: 0  - naproxen (NAPROSYN) 500 MG Tab; Take 1 tablet by mouth 2 times a day with meals for 30 doses.  Dispense: 30 tablet; Refill: 0    2. Muscle spasm  - tizanidine (ZANAFLEX) 4 MG Tab; Take 1 tablet by mouth every 6 hours as needed.  Dispense: 30 tablet; Refill: 3  - methylPREDNISolone (MEDROL DOSEPAK) 4 MG Tablet Therapy Pack; Follow schedule on package instructions.  Dispense: 21 tablet; Refill: 0  - naproxen (NAPROSYN) 500 MG Tab; Take 1 tablet by mouth 2 times a day with meals for 30 doses.  Dispense: 30 tablet; Refill: 0    3. Chronic bilateral low back pain without sciatica  - methylPREDNISolone (MEDROL DOSEPAK) 4 MG Tablet Therapy Pack; Follow schedule on package instructions.  Dispense: 21 tablet; Refill: 0  - naproxen (NAPROSYN) 500 MG Tab; Take 1 tablet by mouth 2 times a day with meals for 30 doses.  Dispense: 30 tablet; Refill: 0    Vital signs stable at today's acute urgent care visit.  Begin medications as listed. Patient reports that Flexeril has not worked for him in the past, is willing to trial Zanaflex.  Discussed management options (risks, benefits,  and alternatives to treatment).  He declines PT referral at this time.    Advised the patient to follow-up with the primary care provider for recheck, reevaluation, and/or consideration of further management if necessary. Return to urgent care with any worsening symptoms or if there is no improvement in their current condition. Red flags discussed and indications to immediately call 911 or present to the ED.  All questions were encouraged and answered to the patient's satisfaction and understanding, and they agree to the plan of care.     I personally reviewed prior external notes and test results pertinent to today's visit.  I have independently reviewed and interpreted all diagnostics ordered during this urgent care acute visit. Time spent evaluating this patient was a minimum of 30 minutes and includes preparing for visit, counseling/education, exam, evaluation, obtaining history, and ordering lab/test/procedures.      Please note that this dictation was created using voice recognition software. I have made a reasonable attempt to correct obvious errors, but I expect that there are errors of grammar and possibly content that I did not discover before finalizing the note.

## 2021-07-16 DIAGNOSIS — M62.838 MUSCLE SPASM: ICD-10-CM

## 2021-07-16 DIAGNOSIS — G47.01 INSOMNIA DUE TO MEDICAL CONDITION: ICD-10-CM

## 2021-07-16 DIAGNOSIS — M54.50 ACUTE BILATERAL LOW BACK PAIN WITHOUT SCIATICA: ICD-10-CM

## 2021-07-16 RX ORDER — CARISOPRODOL 350 MG/1
350 TABLET ORAL 4 TIMES DAILY
Qty: 20 TABLET | Refills: 0 | Status: SHIPPED | OUTPATIENT
Start: 2021-07-16 | End: 2021-07-21

## 2021-07-16 NOTE — PROGRESS NOTES
Received phone call from patient stating that his back pain has not improved with medications prescribed 2 days ago.  Patient reports having significant relief when taking Soma and asked for refills medication.  Refilled as listed.  Advised patient to follow-up with PCP should his back pain not resolve within 2 to 3 days.

## 2021-11-09 ENCOUNTER — TELEPHONE (OUTPATIENT)
Dept: HEALTH INFORMATION MANAGEMENT | Facility: OTHER | Age: 71
End: 2021-11-09

## 2021-11-11 PROBLEM — E78.00 ELEVATED LDL CHOLESTEROL LEVEL: Status: ACTIVE | Noted: 2021-11-11

## 2021-11-11 PROBLEM — G25.0 BENIGN FAMILIAL TREMOR: Status: ACTIVE | Noted: 2021-11-11

## 2021-11-11 PROBLEM — N17.9 ACUTE RENAL FAILURE (ARF) (HCC): Status: RESOLVED | Noted: 2018-08-26 | Resolved: 2021-11-11

## 2021-11-11 PROBLEM — R73.03 PREDIABETES: Status: ACTIVE | Noted: 2021-11-11

## 2021-11-11 PROBLEM — F13.20 SEDATIVE, HYPNOTIC, OR ANXIOLYTIC DEPENDENCE (HCC): Status: ACTIVE | Noted: 2021-11-11

## 2022-06-05 ENCOUNTER — HOSPITAL ENCOUNTER (EMERGENCY)
Facility: MEDICAL CENTER | Age: 72
End: 2022-06-05
Attending: EMERGENCY MEDICINE
Payer: MEDICARE

## 2022-06-05 ENCOUNTER — APPOINTMENT (OUTPATIENT)
Dept: RADIOLOGY | Facility: MEDICAL CENTER | Age: 72
End: 2022-06-05
Attending: EMERGENCY MEDICINE
Payer: MEDICARE

## 2022-06-05 VITALS
OXYGEN SATURATION: 94 % | RESPIRATION RATE: 16 BRPM | SYSTOLIC BLOOD PRESSURE: 138 MMHG | WEIGHT: 206.35 LBS | TEMPERATURE: 97 F | DIASTOLIC BLOOD PRESSURE: 91 MMHG | BODY MASS INDEX: 30.56 KG/M2 | HEART RATE: 88 BPM | HEIGHT: 69 IN

## 2022-06-05 DIAGNOSIS — S01.81XA FACIAL LACERATION, INITIAL ENCOUNTER: ICD-10-CM

## 2022-06-05 DIAGNOSIS — S09.90XA CLOSED HEAD INJURY, INITIAL ENCOUNTER: ICD-10-CM

## 2022-06-05 DIAGNOSIS — T07.XXXA ABRASIONS OF MULTIPLE SITES: ICD-10-CM
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
## 2022-06-05 PROCEDURE — 304217 HCHG IRRIGATION SYSTEM

## 2022-06-05 PROCEDURE — A9270 NON-COVERED ITEM OR SERVICE: HCPCS | Performed by: EMERGENCY MEDICINE

## 2022-06-05 PROCEDURE — 303747 HCHG EXTRA SUTURE

## 2022-06-05 PROCEDURE — 700102 HCHG RX REV CODE 250 W/ 637 OVERRIDE(OP): Performed by: EMERGENCY MEDICINE

## 2022-06-05 PROCEDURE — 304999 HCHG REPAIR-SIMPLE/INTERMED LEVEL 1

## 2022-06-05 PROCEDURE — 700101 HCHG RX REV CODE 250: Performed by: EMERGENCY MEDICINE

## 2022-06-05 PROCEDURE — 99284 EMERGENCY DEPT VISIT MOD MDM: CPT

## 2022-06-05 PROCEDURE — 70486 CT MAXILLOFACIAL W/O DYE: CPT | Mod: ME

## 2022-06-05 PROCEDURE — 70450 CT HEAD/BRAIN W/O DYE: CPT | Mod: ME

## 2022-06-05 RX ORDER — ACETAMINOPHEN 500 MG
1000 TABLET ORAL ONCE
Status: COMPLETED | OUTPATIENT
Start: 2022-06-05 | End: 2022-06-05

## 2022-06-05 RX ORDER — LIDOCAINE HYDROCHLORIDE AND EPINEPHRINE 10; 10 MG/ML; UG/ML
20 INJECTION, SOLUTION INFILTRATION; PERINEURAL ONCE
Status: COMPLETED | OUTPATIENT
Start: 2022-06-05 | End: 2022-06-05

## 2022-06-05 RX ORDER — OXYCODONE HYDROCHLORIDE 5 MG/1
5 TABLET ORAL ONCE
Status: COMPLETED | OUTPATIENT
Start: 2022-06-05 | End: 2022-06-05

## 2022-06-05 RX ORDER — OXYCODONE HYDROCHLORIDE 5 MG/1
5 TABLET ORAL EVERY 4 HOURS PRN
Qty: 10 TABLET | Refills: 0 | Status: SHIPPED | OUTPATIENT
Start: 2022-06-05 | End: 2022-06-08

## 2022-06-05 RX ORDER — IBUPROFEN 600 MG/1
600 TABLET ORAL ONCE
Status: COMPLETED | OUTPATIENT
Start: 2022-06-05 | End: 2022-06-05

## 2022-06-05 RX ADMIN — OXYCODONE HYDROCHLORIDE 5 MG: 5 TABLET ORAL at 13:55

## 2022-06-05 RX ADMIN — ACETAMINOPHEN 1000 MG: 500 TABLET ORAL at 13:52

## 2022-06-05 RX ADMIN — IBUPROFEN 600 MG: 600 TABLET ORAL at 13:51

## 2022-06-05 RX ADMIN — LIDOCAINE HYDROCHLORIDE AND EPINEPHRINE 20 ML: 10; 10 INJECTION, SOLUTION INFILTRATION; PERINEURAL at 13:15

## 2022-06-05 ASSESSMENT — FIBROSIS 4 INDEX: FIB4 SCORE: 0.58

## 2022-06-05 NOTE — ED NOTES
Neosporin applied to abrasions and sutures. Discharged to home with instructions to follow up with his doctor, sutures out in 5 days, and watch for infection. Friend to drive him home.

## 2022-06-05 NOTE — ED PROVIDER NOTES
ED Provider Note    Scribed for Ford Balderas M.D. by Juan Leger. 6/5/2022  12:42 PM    Primary care provider: Vee Coburn M.D.  Means of arrival: Walk in  History obtained from: Patient  History limited by: None    CHIEF COMPLAINT  Chief Complaint   Patient presents with   • T-5000 GLF     Tripped in his driveway at approx 1130  Sustained facial injuries   Denies LOC or neck pain Not on blood thinners       HPI  Gunnar Mcneil is a 71 y.o. male who presents to the Emergency Department for evaluation of a moderate ground level fall onset prior to arrival. The patient states that as he was returning home from a walk earlier today, he lost his balance while walking up his driveway and fell forwards. The patient reports primarily striking his face including the nose and forehead. Associated symptoms include head pain, forehead pain, and nose pain. The patient denies any chest pain, upper extremity pain, loss of consciousness, neck pain, or back pain. He is not currently medicating with any daily blood thinners. Patient medicated with Tylenol with minimal alleviation. No exacerbating factors noted. His head was bandaged by an EMT prior to arriving to the ED. Patient is unsure if he is up to date with his tetanus vaccine.     REVIEW OF SYSTEMS  Pertinent positives include ground level fall, head pain, forehead pain, and nose pain. Pertinent negatives include no chest pain, upper extremity pain, loss of consciousness, neck pain, or back pain.  All other systems reviewed and negative.    PAST MEDICAL HISTORY   has a past medical history of Anxiety, Depression, Hypertension, and Low back pain.    SURGICAL HISTORY  patient denies any surgical history    SOCIAL HISTORY  Social History     Tobacco Use   • Smoking status: Never Smoker   • Smokeless tobacco: Never Used   Vaping Use   • Vaping Use: Never used   Substance Use Topics   • Alcohol use: No   • Drug use: No      Social History     Substance and Sexual  "Activity   Drug Use No       FAMILY HISTORY  Family History   Problem Relation Age of Onset   • Cancer Mother 65        breast   • Hypertension Mother    • Cancer Maternal Grandmother         lung       CURRENT MEDICATIONS  Home Medications    **Home medications have not yet been reviewed for this encounter**         ALLERGIES  Allergies   Allergen Reactions   • Bactrim [Sulfamethoxazole W-Trimethoprim]        PHYSICAL EXAM  VITAL SIGNS: /89   Pulse (!) 104   Temp 36.1 °C (97 °F) (Temporal)   Resp 16   Ht 1.753 m (5' 9\")   Wt 93.6 kg (206 lb 5.6 oz)   SpO2 96%   BMI 30.47 kg/m²     Constitutional: Well developed, Well nourished, no acute distress, Non-toxic appearance.   HENT: Normocephalic, Abrasion on the forehead and bridge of nose, 3.5 cm laceration to the mid-left forehead, tenderness to palpation to the bridge of the nose. Bilateral external ears normal, Oropharynx moist, No oral exudates.   Eyes: PERRLA, EOMI, Conjunctiva normal, No discharge.   Neck: No C-Spine tenderness to palpation No tenderness, Supple, No stridor.   Lymphatic: No lymphadenopathy noted.   Cardiovascular: Normal heart rate, Normal rhythm.   Thorax & Lungs: Clear to auscultation bilaterally, No respiratory distress, No wheezing, No crackles. No T-Spine tenderness to palpation  Abdomen: Soft, No tenderness, No masses, No pulsatile masses.   Skin: Warm, Dry, No erythema, No rash.   Back: No L-Spine tenderness to palpation  Extremities:, No edema No cyanosis.   Musculoskeletal: No tenderness to palpation or major deformities noted.  Intact distal pulses  Neurologic: Awake, alert. Moves all extremities spontaneously.  Psychiatric: Affect normal, Judgment normal, Mood normal.     PROCEDURES    Laceration Repair Procedure Note    Indication: Laceration    Procedure: The patient was placed in the appropriate position and anesthesia around the laceration was obtained by infiltration using 1% Lidocaine with epinephrine. The area was " then irrigated with normal saline. The laceration was closed with 4-0 Ethilon using interrupted sutures. There were no additional lacerations requiring repair. The wound area was then dressed with a sterile dressing.      Total repaired wound length: 3.5 cm.     Other Items: Suture count: 4    The patient tolerated the procedure well.    Complications: None    RADIOLOGY  CT-HEAD W/O   Final Result         NO ACUTE ABNORMALITIES ARE NOTED ON CT SCAN OF THE HEAD.      Findings are consistent with atrophy.  Decreased attenuation in the periventricular white matter likely indicates microvascular ischemic disease.         CT-MAXILLOFACIAL W/O PLUS RECONS   Final Result         1.  No evidence of facial fracture.      2.  Frontal soft tissue swelling and laceration.      3.  Punctate submillimeter foreign bodies are noted at or near the skin surface.        The radiologist's interpretation of all radiological studies have been reviewed by me.      COURSE & MEDICAL DECISION MAKING  Pertinent Labs & Imaging studies reviewed. (See chart for details)        12:42 PM - Patient seen and examined at bedside. Patient will be treated with Tetanus-dipth-acell pertussis 0.5 mL. Ordered CT-Head W/O and CT-Maxillofacial W/O Plus Recons to evaluate his symptoms.Discussed utilizing imaging to rule out any additional trauma with the patient, they are amenable to the plan of care.     1:34 PM - Ordered Motrin 600 mg and Tylenol 1000 mg to treat the patient.    1:42 PM - Patient was reevaluated at bedside. Discussed radiology results with the patient. Laceration repair performed by me, as detailed above. Explained proper wound care procedures with the patient. Discussed plan for discharge; I advised the patient to follow-up for suture removal, and to return to the [facility] ED with any new or worsening symptoms. Patient was given the opportunity for questions. I addressed all questions or concerns at this time and they verbalize agreement to  the plan of care.     1:52 PM - Ordered Oxycodone 5 mg to treat the patient.     Decision Making:  Status post fall, facial abrasions, CT scans are negative, sutured the patient's laceration, have the patient return in 5 to 7 days for suture removal, have the patient return with worsening symptoms.    I reviewed prescription monitoring program for patient's narcotic use before prescribing a scheduled drug.The patient will not drink alcohol nor drive with prescribed medications.The patient will return for new or worsening symptoms and is stable at the time of discharge.    The patient is referred to a primary physician for blood pressure management, diabetic screening, and for all other preventative health concerns.    I have queried the patient in the prescription monitoring program, I do not see any evidence of prescription abuse.      DISPOSITION:  Patient will be discharged home in stable condition.    FOLLOW UP:  No follow-up provider specified.    OUTPATIENT MEDICATIONS:  Discharge Medication List as of 6/5/2022  2:01 PM      START taking these medications    Details   oxyCODONE immediate-release (ROXICODONE) 5 MG Tab Take 1 Tablet by mouth every four hours as needed for Severe Pain for up to 3 days., Disp-10 Tablet, R-0, Normal               FINAL IMPRESSION  1. Fall, initial encounter    2. Closed head injury, initial encounter    3. Facial laceration, initial encounter    4. Abrasions of multiple sites    5.      Laceration repair performed by me, as detailed above      Juan KNOTT (Scribe), am scribing for, and in the presence of, Ford Balderas M.D..    Electronically signed by: Juan Leger (Chloéibe), 6/5/2022    Ford KNOTT M.D. personally performed the services described in this documentation, as scribed by Juan Leger in my presence, and it is both accurate and complete.    The note accurately reflects work and decisions made by me.  Ford Balderas M.D.  6/5/2022  5:32  PM

## 2022-06-05 NOTE — ED TRIAGE NOTES
AAO Full recall of events No midline neck tenderness    Forehead lac and nasal abrasions  Mild abrasions bilat hands

## 2022-09-19 ENCOUNTER — HOSPITAL ENCOUNTER (OUTPATIENT)
Dept: LAB | Facility: MEDICAL CENTER | Age: 72
End: 2022-09-19
Attending: FAMILY MEDICINE
Payer: MEDICARE

## 2022-09-19 LAB
ALBUMIN SERPL BCP-MCNC: 4.2 G/DL (ref 3.2–4.9)
ALBUMIN/GLOB SERPL: 2 G/DL
ALP SERPL-CCNC: 101 U/L (ref 30–99)
ALT SERPL-CCNC: 16 U/L (ref 2–50)
ANION GAP SERPL CALC-SCNC: 10 MMOL/L (ref 7–16)
AST SERPL-CCNC: 13 U/L (ref 12–45)
BASOPHILS # BLD AUTO: 1.5 % (ref 0–1.8)
BASOPHILS # BLD: 0.12 K/UL (ref 0–0.12)
BILIRUB SERPL-MCNC: 0.5 MG/DL (ref 0.1–1.5)
BUN SERPL-MCNC: 12 MG/DL (ref 8–22)
CALCIUM SERPL-MCNC: 8.8 MG/DL (ref 8.5–10.5)
CHLORIDE SERPL-SCNC: 103 MMOL/L (ref 96–112)
CHOLEST SERPL-MCNC: 151 MG/DL (ref 100–199)
CO2 SERPL-SCNC: 24 MMOL/L (ref 20–33)
CREAT SERPL-MCNC: 0.82 MG/DL (ref 0.5–1.4)
EOSINOPHIL # BLD AUTO: 0.15 K/UL (ref 0–0.51)
EOSINOPHIL NFR BLD: 1.9 % (ref 0–6.9)
ERYTHROCYTE [DISTWIDTH] IN BLOOD BY AUTOMATED COUNT: 38.2 FL (ref 35.9–50)
EST. AVERAGE GLUCOSE BLD GHB EST-MCNC: 117 MG/DL
FASTING STATUS PATIENT QL REPORTED: NORMAL
GFR SERPLBLD CREATININE-BSD FMLA CKD-EPI: 94 ML/MIN/1.73 M 2
GLOBULIN SER CALC-MCNC: 2.1 G/DL (ref 1.9–3.5)
GLUCOSE SERPL-MCNC: 108 MG/DL (ref 65–99)
HBA1C MFR BLD: 5.7 % (ref 4–5.6)
HCT VFR BLD AUTO: 47.6 % (ref 42–52)
HCV AB SER QL: NORMAL
HDLC SERPL-MCNC: 43 MG/DL
HGB BLD-MCNC: 16.4 G/DL (ref 14–18)
IMM GRANULOCYTES # BLD AUTO: 0.04 K/UL (ref 0–0.11)
IMM GRANULOCYTES NFR BLD AUTO: 0.5 % (ref 0–0.9)
LDLC SERPL CALC-MCNC: 94 MG/DL
LYMPHOCYTES # BLD AUTO: 1.98 K/UL (ref 1–4.8)
LYMPHOCYTES NFR BLD: 24.8 % (ref 22–41)
MCH RBC QN AUTO: 29.1 PG (ref 27–33)
MCHC RBC AUTO-ENTMCNC: 34.5 G/DL (ref 33.7–35.3)
MCV RBC AUTO: 84.5 FL (ref 81.4–97.8)
MONOCYTES # BLD AUTO: 0.7 K/UL (ref 0–0.85)
MONOCYTES NFR BLD AUTO: 8.8 % (ref 0–13.4)
NEUTROPHILS # BLD AUTO: 4.98 K/UL (ref 1.82–7.42)
NEUTROPHILS NFR BLD: 62.5 % (ref 44–72)
NRBC # BLD AUTO: 0 K/UL
NRBC BLD-RTO: 0 /100 WBC
PLATELET # BLD AUTO: 363 K/UL (ref 164–446)
PMV BLD AUTO: 9.3 FL (ref 9–12.9)
POTASSIUM SERPL-SCNC: 4.5 MMOL/L (ref 3.6–5.5)
PROT SERPL-MCNC: 6.3 G/DL (ref 6–8.2)
PSA SERPL-MCNC: 1.34 NG/ML (ref 0–4)
RBC # BLD AUTO: 5.63 M/UL (ref 4.7–6.1)
SODIUM SERPL-SCNC: 137 MMOL/L (ref 135–145)
TRIGL SERPL-MCNC: 71 MG/DL (ref 0–149)
WBC # BLD AUTO: 8 K/UL (ref 4.8–10.8)

## 2022-09-19 PROCEDURE — 80053 COMPREHEN METABOLIC PANEL: CPT

## 2022-09-19 PROCEDURE — 36415 COLL VENOUS BLD VENIPUNCTURE: CPT

## 2022-09-19 PROCEDURE — 85025 COMPLETE CBC W/AUTO DIFF WBC: CPT

## 2022-09-19 PROCEDURE — 86803 HEPATITIS C AB TEST: CPT

## 2022-09-19 PROCEDURE — 80061 LIPID PANEL: CPT

## 2022-09-19 PROCEDURE — 84153 ASSAY OF PSA TOTAL: CPT

## 2022-09-19 PROCEDURE — 83036 HEMOGLOBIN GLYCOSYLATED A1C: CPT

## 2022-09-21 ENCOUNTER — TELEPHONE (OUTPATIENT)
Dept: HEALTH INFORMATION MANAGEMENT | Facility: OTHER | Age: 72
End: 2022-09-21

## 2022-10-13 ENCOUNTER — DOCUMENTATION (OUTPATIENT)
Dept: HEALTH INFORMATION MANAGEMENT | Facility: OTHER | Age: 72
End: 2022-10-13
Payer: MEDICARE

## 2023-01-24 ENCOUNTER — HOSPITAL ENCOUNTER (OUTPATIENT)
Facility: MEDICAL CENTER | Age: 73
End: 2023-01-24
Attending: NURSE PRACTITIONER
Payer: MEDICARE

## 2023-01-24 PROCEDURE — 84439 ASSAY OF FREE THYROXINE: CPT

## 2023-01-24 PROCEDURE — 84443 ASSAY THYROID STIM HORMONE: CPT

## 2023-01-25 LAB
T4 FREE SERPL-MCNC: 1.29 NG/DL (ref 0.93–1.7)
T4 SERPL-MCNC: 9.2 UG/DL (ref 4–12)
TSH SERPL DL<=0.005 MIU/L-ACNC: 1.92 UIU/ML (ref 0.38–5.33)

## 2023-06-07 ENCOUNTER — APPOINTMENT (RX ONLY)
Dept: URBAN - METROPOLITAN AREA CLINIC 4 | Facility: CLINIC | Age: 73
Setting detail: DERMATOLOGY
End: 2023-06-07

## 2023-06-07 DIAGNOSIS — L82.1 OTHER SEBORRHEIC KERATOSIS: ICD-10-CM

## 2023-06-07 DIAGNOSIS — L57.0 ACTINIC KERATOSIS: ICD-10-CM | Status: INADEQUATELY CONTROLLED

## 2023-06-07 DIAGNOSIS — Z71.89 OTHER SPECIFIED COUNSELING: ICD-10-CM

## 2023-06-07 DIAGNOSIS — L82.0 INFLAMED SEBORRHEIC KERATOSIS: ICD-10-CM

## 2023-06-07 PROCEDURE — ? PRESCRIPTION

## 2023-06-07 PROCEDURE — ? COUNSELING

## 2023-06-07 PROCEDURE — ? LIQUID NITROGEN

## 2023-06-07 PROCEDURE — 99204 OFFICE O/P NEW MOD 45 MIN: CPT | Mod: 25

## 2023-06-07 PROCEDURE — 17110 DESTRUCTION B9 LES UP TO 14: CPT

## 2023-06-07 RX ORDER — FLUOROURACIL 2 G/40G
1 CREAM TOPICAL BID
Qty: 40 | Refills: 1 | Status: ERX | COMMUNITY
Start: 2023-06-07

## 2023-06-07 RX ADMIN — FLUOROURACIL 1: 2 CREAM TOPICAL at 00:00

## 2023-06-07 ASSESSMENT — LOCATION ZONE DERM
LOCATION ZONE: LIP
LOCATION ZONE: NECK
LOCATION ZONE: FACE

## 2023-06-07 ASSESSMENT — LOCATION SIMPLE DESCRIPTION DERM
LOCATION SIMPLE: LEFT CHEEK
LOCATION SIMPLE: RIGHT ANTERIOR NECK
LOCATION SIMPLE: RIGHT CHEEK
LOCATION SIMPLE: LEFT FOREHEAD
LOCATION SIMPLE: SUPERIOR FOREHEAD
LOCATION SIMPLE: RIGHT LIP

## 2023-06-07 ASSESSMENT — LOCATION DETAILED DESCRIPTION DERM
LOCATION DETAILED: LEFT CENTRAL MALAR CHEEK
LOCATION DETAILED: RIGHT CENTRAL MALAR CHEEK
LOCATION DETAILED: RIGHT CLAVICULAR NECK
LOCATION DETAILED: RIGHT LOWER CUTANEOUS LIP
LOCATION DETAILED: LEFT INFERIOR FOREHEAD
LOCATION DETAILED: SUPERIOR MID FOREHEAD

## 2023-06-07 NOTE — PROCEDURE: LIQUID NITROGEN
Show Spray Paint Technique Variable?: Yes
Add 52 Modifier (Optional): no
Medical Necessity Clause: This procedure was medically necessary because the lesions that were treated were:
Detail Level: Detailed
Consent: The patient's consent was obtained including but not limited to risks of crusting, scabbing, blistering, scarring, darker or lighter pigmentary change, recurrence, incomplete removal and infection.
Medical Necessity Information: It is in your best interest to select a reason for this procedure from the list below. All of these items fulfill various CMS LCD requirements except the new and changing color options.
Spray Paint Text: The liquid nitrogen was applied to the skin utilizing a spray paint frosting technique.
Post-Care Instructions: I reviewed with the patient in detail post-care instructions. Patient is to wear sunprotection, and avoid picking at any of the treated lesions. Pt may apply Vaseline to crusted or scabbing areas.

## 2023-09-05 ENCOUNTER — TELEPHONE (OUTPATIENT)
Dept: HEALTH INFORMATION MANAGEMENT | Facility: OTHER | Age: 73
End: 2023-09-05
Payer: MEDICARE

## 2023-09-10 ENCOUNTER — OFFICE VISIT (OUTPATIENT)
Dept: URGENT CARE | Facility: CLINIC | Age: 73
End: 2023-09-10
Payer: MEDICARE

## 2023-09-10 VITALS
HEART RATE: 96 BPM | OXYGEN SATURATION: 98 % | BODY MASS INDEX: 29.55 KG/M2 | TEMPERATURE: 97.3 F | RESPIRATION RATE: 16 BRPM | WEIGHT: 195 LBS | HEIGHT: 68 IN | DIASTOLIC BLOOD PRESSURE: 74 MMHG | SYSTOLIC BLOOD PRESSURE: 140 MMHG

## 2023-09-10 DIAGNOSIS — G89.29 CHRONIC BILATERAL LOW BACK PAIN WITHOUT SCIATICA: ICD-10-CM

## 2023-09-10 DIAGNOSIS — M54.50 CHRONIC BILATERAL LOW BACK PAIN WITHOUT SCIATICA: ICD-10-CM

## 2023-09-10 DIAGNOSIS — K29.00 ACUTE SUPERFICIAL GASTRITIS WITHOUT HEMORRHAGE: ICD-10-CM

## 2023-09-10 PROCEDURE — 3077F SYST BP >= 140 MM HG: CPT | Performed by: PHYSICIAN ASSISTANT

## 2023-09-10 PROCEDURE — 3078F DIAST BP <80 MM HG: CPT | Performed by: PHYSICIAN ASSISTANT

## 2023-09-10 PROCEDURE — 99213 OFFICE O/P EST LOW 20 MIN: CPT | Performed by: PHYSICIAN ASSISTANT

## 2023-09-10 RX ORDER — KETOROLAC TROMETHAMINE 30 MG/ML
30 INJECTION, SOLUTION INTRAMUSCULAR; INTRAVENOUS ONCE
Status: COMPLETED | OUTPATIENT
Start: 2023-09-10 | End: 2023-09-10

## 2023-09-10 RX ORDER — SUCRALFATE 1 G/1
1 TABLET ORAL
Qty: 120 TABLET | Refills: 0 | Status: SHIPPED | OUTPATIENT
Start: 2023-09-10

## 2023-09-10 RX ADMIN — KETOROLAC TROMETHAMINE 30 MG: 30 INJECTION, SOLUTION INTRAMUSCULAR; INTRAVENOUS at 16:16

## 2023-09-10 ASSESSMENT — ENCOUNTER SYMPTOMS
ABDOMINAL PAIN: 1
DIARRHEA: 0
BLOOD IN STOOL: 0
VOMITING: 0
BACK PAIN: 1
HEARTBURN: 1
ANOREXIA: 0
NAUSEA: 0
FEVER: 0
CONSTIPATION: 0
PAIN: 1

## 2023-09-10 ASSESSMENT — FIBROSIS 4 INDEX: FIB4 SCORE: 0.64

## 2023-10-05 PROBLEM — E66.9 OBESITY (BMI 30-39.9): Status: ACTIVE | Noted: 2023-10-05

## 2023-10-13 ENCOUNTER — HOSPITAL ENCOUNTER (OUTPATIENT)
Dept: LAB | Facility: MEDICAL CENTER | Age: 73
End: 2023-10-13
Attending: FAMILY MEDICINE
Payer: MEDICARE

## 2023-10-13 LAB
ALBUMIN SERPL BCP-MCNC: 4.4 G/DL (ref 3.2–4.9)
ALBUMIN/GLOB SERPL: 1.8 G/DL
ALP SERPL-CCNC: 99 U/L (ref 30–99)
ALT SERPL-CCNC: 15 U/L (ref 2–50)
ANION GAP SERPL CALC-SCNC: 11 MMOL/L (ref 7–16)
AST SERPL-CCNC: 15 U/L (ref 12–45)
BASOPHILS # BLD AUTO: 1.7 % (ref 0–1.8)
BASOPHILS # BLD: 0.15 K/UL (ref 0–0.12)
BILIRUB SERPL-MCNC: 0.3 MG/DL (ref 0.1–1.5)
BUN SERPL-MCNC: 14 MG/DL (ref 8–22)
CALCIUM ALBUM COR SERPL-MCNC: 8.7 MG/DL (ref 8.5–10.5)
CALCIUM SERPL-MCNC: 9 MG/DL (ref 8.5–10.5)
CHLORIDE SERPL-SCNC: 104 MMOL/L (ref 96–112)
CHOLEST SERPL-MCNC: 161 MG/DL (ref 100–199)
CO2 SERPL-SCNC: 24 MMOL/L (ref 20–33)
CREAT SERPL-MCNC: 0.85 MG/DL (ref 0.5–1.4)
EOSINOPHIL # BLD AUTO: 0.26 K/UL (ref 0–0.51)
EOSINOPHIL NFR BLD: 2.9 % (ref 0–6.9)
ERYTHROCYTE [DISTWIDTH] IN BLOOD BY AUTOMATED COUNT: 39.3 FL (ref 35.9–50)
EST. AVERAGE GLUCOSE BLD GHB EST-MCNC: 117 MG/DL
FASTING STATUS PATIENT QL REPORTED: NORMAL
GFR SERPLBLD CREATININE-BSD FMLA CKD-EPI: 93 ML/MIN/1.73 M 2
GLOBULIN SER CALC-MCNC: 2.4 G/DL (ref 1.9–3.5)
GLUCOSE SERPL-MCNC: 100 MG/DL (ref 65–99)
HBA1C MFR BLD: 5.7 % (ref 4–5.6)
HCT VFR BLD AUTO: 48.9 % (ref 42–52)
HDLC SERPL-MCNC: 55 MG/DL
HGB BLD-MCNC: 16.5 G/DL (ref 14–18)
IMM GRANULOCYTES # BLD AUTO: 0.03 K/UL (ref 0–0.11)
IMM GRANULOCYTES NFR BLD AUTO: 0.3 % (ref 0–0.9)
LDLC SERPL CALC-MCNC: 93 MG/DL
LYMPHOCYTES # BLD AUTO: 2.8 K/UL (ref 1–4.8)
LYMPHOCYTES NFR BLD: 31 % (ref 22–41)
MCH RBC QN AUTO: 28.9 PG (ref 27–33)
MCHC RBC AUTO-ENTMCNC: 33.7 G/DL (ref 32.3–36.5)
MCV RBC AUTO: 85.8 FL (ref 81.4–97.8)
MONOCYTES # BLD AUTO: 0.83 K/UL (ref 0–0.85)
MONOCYTES NFR BLD AUTO: 9.2 % (ref 0–13.4)
NEUTROPHILS # BLD AUTO: 4.96 K/UL (ref 1.82–7.42)
NEUTROPHILS NFR BLD: 54.9 % (ref 44–72)
NRBC # BLD AUTO: 0 K/UL
NRBC BLD-RTO: 0 /100 WBC (ref 0–0.2)
PLATELET # BLD AUTO: 366 K/UL (ref 164–446)
PMV BLD AUTO: 9.5 FL (ref 9–12.9)
POTASSIUM SERPL-SCNC: 4.3 MMOL/L (ref 3.6–5.5)
PROT SERPL-MCNC: 6.8 G/DL (ref 6–8.2)
PSA SERPL-MCNC: 0.73 NG/ML (ref 0–4)
RBC # BLD AUTO: 5.7 M/UL (ref 4.7–6.1)
SODIUM SERPL-SCNC: 139 MMOL/L (ref 135–145)
TRIGL SERPL-MCNC: 66 MG/DL (ref 0–149)
WBC # BLD AUTO: 9 K/UL (ref 4.8–10.8)

## 2023-10-13 PROCEDURE — 80061 LIPID PANEL: CPT

## 2023-10-13 PROCEDURE — 85025 COMPLETE CBC W/AUTO DIFF WBC: CPT

## 2023-10-13 PROCEDURE — 84153 ASSAY OF PSA TOTAL: CPT

## 2023-10-13 PROCEDURE — 80053 COMPREHEN METABOLIC PANEL: CPT

## 2023-10-13 PROCEDURE — 83036 HEMOGLOBIN GLYCOSYLATED A1C: CPT

## 2023-10-13 PROCEDURE — 36415 COLL VENOUS BLD VENIPUNCTURE: CPT

## 2024-06-04 NOTE — PROGRESS NOTES
Subjective     Gunnar Mcneil is a 73 y.o. 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 States LUQ aches )            Patient presents clinic today with complaint of exacerbation of chronic low back pain which she has seen his chiropractor twice this week for.  She says it temporarily improved her back and is still painful.  Patient has been given a Toradol injection before for this and was hoping to get one today.  As he has to drive his father to [place] this evening and would like some pain relief without taking anything by mouth.  Subsequently, patient states he has been taking more over-the-counter ibuprofen and Tylenol than usual which is giving him some stomach pain and heartburn.  Patient has been taking some omeprazole for this but he was wondering if there was anything else he could take or be prescribed.  Patient denies dark stools, bloody stools just heartburn. PT denies CP, sob, nvd.  PT denies any other complaint.       Pain  This is a new problem. The current episode started in the past 7 days. The problem occurs intermittently. The problem has been waxing and waning. Associated symptoms include abdominal pain. Pertinent negatives include no anorexia, fever, nausea or vomiting. Associated symptoms comments: Chronic low back pain. The symptoms are aggravated by exertion and bending. He has tried acetaminophen, NSAIDs and position changes (Chiropractor) for the symptoms. The treatment provided mild relief.       Review of Systems   Constitutional:  Negative for fever.   Gastrointestinal:  Positive for abdominal pain and heartburn. Negative for anorexia, blood in stool, constipation, diarrhea, melena, nausea and vomiting.   Musculoskeletal:  Positive for back pain.   All other systems reviewed and are negative.             Objective     BP (!) 140/74 (BP Location: Left arm, Patient Position: Sitting, BP Cuff Size: Adult)   Pulse 96   Temp 36.3 °C (97.3 °F)  "(Temporal)   Resp 16   Ht 1.727 m (5' 8\")   Wt 88.5 kg (195 lb)   SpO2 98%   BMI 29.65 kg/m²      Physical Exam  Vitals and nursing note reviewed.   Constitutional:       General: He is not in acute distress.     Appearance: Normal appearance. He is well-developed and normal weight. He is not ill-appearing or toxic-appearing.   HENT:      Head: Normocephalic and atraumatic.      Right Ear: Tympanic membrane normal.      Left Ear: Tympanic membrane normal.      Nose: Nose normal.      Mouth/Throat:      Lips: Pink.      Mouth: Mucous membranes are moist.      Pharynx: Oropharynx is clear. Uvula midline.   Eyes:      Extraocular Movements: Extraocular movements intact.      Conjunctiva/sclera: Conjunctivae normal.      Pupils: Pupils are equal, round, and reactive to light.   Cardiovascular:      Rate and Rhythm: Normal rate and regular rhythm.      Pulses: Normal pulses.      Heart sounds: Normal heart sounds.   Pulmonary:      Effort: Pulmonary effort is normal.      Breath sounds: Normal breath sounds.   Abdominal:      General: Bowel sounds are normal.      Palpations: Abdomen is soft.   Musculoskeletal:         General: Normal range of motion.      Cervical back: Normal range of motion and neck supple.   Skin:     General: Skin is warm and dry.      Capillary Refill: Capillary refill takes less than 2 seconds.   Neurological:      General: No focal deficit present.      Mental Status: He is alert and oriented to person, place, and time.      Cranial Nerves: No cranial nerve deficit.      Motor: Motor function is intact.      Coordination: Coordination is intact.      Gait: Gait normal.   Psychiatric:         Mood and Affect: Mood normal.                             Assessment & Plan                1. Chronic bilateral low back pain without sciatica  sucralfate (CARAFATE) 1 GM Tab    ketorolac (Toradol) injection 30 mg      2. Acute superficial gastritis without hemorrhage  sucralfate (CARAFATE) 1 GM Tab    " ketorolac (Toradol) injection 30 mg        I will treat patient's back pain with Toradol injection, 30 mg IM in clinic today.    I have also given a prescription for Carafate for patient to take as directed prior to eating as well as once before bedtime.  Patient was also encouraged to stick to Tylenol for now for pain as the ibuprofen is likely irritating his stomach.  Patient verbalized understanding and agreement with this plan.    Differential diagnosis, supportive care, and indications for immediate follow-up discussed with patient.  Instructed to return to clinic or nearest emergency department for any change in condition, further concerns, or worsening of symptoms.    I personally reviewed prior external notes and test results pertinent to today's visit.  I have independently reviewed and interpreted all diagnostics ordered during this urgent care visit.    PT should follow up with PCP in 1-2 days for re-evaluation if symptoms have not improved.      Discussed red flags and reasons to return to UC or ED.      Pt and/or family verbalized understanding of diagnosis and follow up instructions and was offered informational handout on diagnosis.  PT discharged.     Please note that this dictation was created using voice recognition software. I have made every reasonable attempt to correct obvious errors, but I expect that there may be errors of grammar and possibly content that I did not discover before finalizing the note.            No

## 2024-06-27 ENCOUNTER — HOSPITAL ENCOUNTER (OUTPATIENT)
Dept: LAB | Facility: MEDICAL CENTER | Age: 74
End: 2024-06-27
Attending: FAMILY MEDICINE
Payer: MEDICARE

## 2024-06-27 LAB
25(OH)D3 SERPL-MCNC: 22 NG/ML (ref 30–100)
ALBUMIN SERPL BCP-MCNC: 4.2 G/DL (ref 3.2–4.9)
ALBUMIN/GLOB SERPL: 1.8 G/DL
ALP SERPL-CCNC: 113 U/L (ref 30–99)
ALT SERPL-CCNC: 15 U/L (ref 2–50)
ANION GAP SERPL CALC-SCNC: 10 MMOL/L (ref 7–16)
AST SERPL-CCNC: 15 U/L (ref 12–45)
BASOPHILS # BLD AUTO: 1.3 % (ref 0–1.8)
BASOPHILS # BLD: 0.13 K/UL (ref 0–0.12)
BILIRUB SERPL-MCNC: 0.6 MG/DL (ref 0.1–1.5)
BUN SERPL-MCNC: 11 MG/DL (ref 8–22)
CALCIUM ALBUM COR SERPL-MCNC: 9 MG/DL (ref 8.5–10.5)
CALCIUM SERPL-MCNC: 9.2 MG/DL (ref 8.5–10.5)
CHLORIDE SERPL-SCNC: 101 MMOL/L (ref 96–112)
CHOLEST SERPL-MCNC: 158 MG/DL (ref 100–199)
CO2 SERPL-SCNC: 26 MMOL/L (ref 20–33)
CREAT SERPL-MCNC: 0.71 MG/DL (ref 0.5–1.4)
EOSINOPHIL # BLD AUTO: 0.14 K/UL (ref 0–0.51)
EOSINOPHIL NFR BLD: 1.4 % (ref 0–6.9)
ERYTHROCYTE [DISTWIDTH] IN BLOOD BY AUTOMATED COUNT: 42 FL (ref 35.9–50)
EST. AVERAGE GLUCOSE BLD GHB EST-MCNC: 117 MG/DL
FASTING STATUS PATIENT QL REPORTED: NORMAL
GFR SERPLBLD CREATININE-BSD FMLA CKD-EPI: 98 ML/MIN/1.73 M 2
GLOBULIN SER CALC-MCNC: 2.4 G/DL (ref 1.9–3.5)
GLUCOSE SERPL-MCNC: 109 MG/DL (ref 65–99)
HBA1C MFR BLD: 5.7 % (ref 4–5.6)
HCT VFR BLD AUTO: 51.3 % (ref 42–52)
HDLC SERPL-MCNC: 54 MG/DL
HGB BLD-MCNC: 17 G/DL (ref 14–18)
IMM GRANULOCYTES # BLD AUTO: 0.04 K/UL (ref 0–0.11)
IMM GRANULOCYTES NFR BLD AUTO: 0.4 % (ref 0–0.9)
LDLC SERPL CALC-MCNC: 89 MG/DL
LYMPHOCYTES # BLD AUTO: 2.25 K/UL (ref 1–4.8)
LYMPHOCYTES NFR BLD: 23.3 % (ref 22–41)
MCH RBC QN AUTO: 29 PG (ref 27–33)
MCHC RBC AUTO-ENTMCNC: 33.1 G/DL (ref 32.3–36.5)
MCV RBC AUTO: 87.5 FL (ref 81.4–97.8)
MONOCYTES # BLD AUTO: 0.83 K/UL (ref 0–0.85)
MONOCYTES NFR BLD AUTO: 8.6 % (ref 0–13.4)
NEUTROPHILS # BLD AUTO: 6.27 K/UL (ref 1.82–7.42)
NEUTROPHILS NFR BLD: 65 % (ref 44–72)
NRBC # BLD AUTO: 0 K/UL
NRBC BLD-RTO: 0 /100 WBC (ref 0–0.2)
PLATELET # BLD AUTO: 383 K/UL (ref 164–446)
PMV BLD AUTO: 9.5 FL (ref 9–12.9)
POTASSIUM SERPL-SCNC: 4.7 MMOL/L (ref 3.6–5.5)
PROT SERPL-MCNC: 6.6 G/DL (ref 6–8.2)
PSA SERPL-MCNC: 0.85 NG/ML (ref 0–4)
RBC # BLD AUTO: 5.86 M/UL (ref 4.7–6.1)
SODIUM SERPL-SCNC: 137 MMOL/L (ref 135–145)
TRIGL SERPL-MCNC: 77 MG/DL (ref 0–149)
WBC # BLD AUTO: 9.7 K/UL (ref 4.8–10.8)

## 2024-06-27 PROCEDURE — 84153 ASSAY OF PSA TOTAL: CPT

## 2024-06-27 PROCEDURE — 83036 HEMOGLOBIN GLYCOSYLATED A1C: CPT

## 2024-06-27 PROCEDURE — 80061 LIPID PANEL: CPT

## 2024-06-27 PROCEDURE — 80053 COMPREHEN METABOLIC PANEL: CPT

## 2024-06-27 PROCEDURE — 36415 COLL VENOUS BLD VENIPUNCTURE: CPT

## 2024-06-27 PROCEDURE — 82306 VITAMIN D 25 HYDROXY: CPT

## 2024-06-27 PROCEDURE — 85025 COMPLETE CBC W/AUTO DIFF WBC: CPT

## 2024-07-17 ENCOUNTER — TELEPHONE (OUTPATIENT)
Dept: HEALTH INFORMATION MANAGEMENT | Facility: OTHER | Age: 74
End: 2024-07-17